# Patient Record
Sex: MALE | Race: WHITE | NOT HISPANIC OR LATINO | Employment: OTHER | URBAN - METROPOLITAN AREA
[De-identification: names, ages, dates, MRNs, and addresses within clinical notes are randomized per-mention and may not be internally consistent; named-entity substitution may affect disease eponyms.]

---

## 2018-04-24 ENCOUNTER — HISTORICAL (OUTPATIENT)
Dept: ADMINISTRATIVE | Facility: HOSPITAL | Age: 68
End: 2018-04-24

## 2018-04-24 LAB
ALBUMIN SERPL-MCNC: 4.5 GM/DL (ref 3.4–5)
ALBUMIN/GLOB SERPL: 1.8 {RATIO} (ref 1.5–2.5)
ALP SERPL-CCNC: 48 UNIT/L (ref 38–126)
ALT SERPL-CCNC: 12 UNIT/L (ref 7–52)
AST SERPL-CCNC: 13 UNIT/L (ref 15–37)
BILIRUB SERPL-MCNC: 0.7 MG/DL (ref 0.2–1)
BILIRUBIN DIRECT+TOT PNL SERPL-MCNC: 0.1 MG/DL (ref 0–0.5)
BUN SERPL-MCNC: 24 MG/DL (ref 7–18)
CALCIUM SERPL-MCNC: 9.3 MG/DL (ref 8.5–10)
CHLORIDE SERPL-SCNC: 105 MMOL/L (ref 98–107)
CHOLEST SERPL-MCNC: 139 MG/DL (ref 0–200)
CHOLEST/HDLC SERPL: 3.7 {RATIO}
CO2 SERPL-SCNC: 25 MMOL/L (ref 21–32)
CREAT SERPL-MCNC: 0.97 MG/DL (ref 0.6–1.3)
CREAT UR-MCNC: 100 MG/DL
CREAT/UREA NIT SERPL: 24.7
EST. AVERAGE GLUCOSE BLD GHB EST-MCNC: 209 MG/DL
GGT SERPL-CCNC: 14 UNIT/L (ref 5–85)
GLOBULIN SER-MCNC: 2.5 GM/DL (ref 1.2–3)
GLUCOSE SERPL-MCNC: 215 MG/DL (ref 74–106)
HBA1C MFR BLD: 8.9 % (ref 4.4–6.4)
HDLC SERPL-MCNC: 38 MG/DL (ref 35–60)
LDH SERPL-CCNC: 203 UNIT/L (ref 140–271)
LDLC SERPL CALC-MCNC: 70 MG/DL (ref 0–129)
MICROALBUMIN UR-MCNC: 10 MG/L
MICROALBUMIN/CREAT RATIO PNL UR: <30 MG/GM
POTASSIUM SERPL-SCNC: 4.7 MMOL/L (ref 3.5–5.1)
PROT SERPL-MCNC: 7 GM/DL (ref 6.4–8.2)
SODIUM SERPL-SCNC: 138 MMOL/L (ref 136–145)
TRIGL SERPL-MCNC: 162 MG/DL (ref 30–150)
URATE SERPL-MCNC: 4.3 MG/DL (ref 2.6–7.2)
VLDLC SERPL CALC-MCNC: 32.4 MG/DL

## 2018-11-21 ENCOUNTER — HISTORICAL (OUTPATIENT)
Dept: ADMINISTRATIVE | Facility: HOSPITAL | Age: 68
End: 2018-11-21

## 2018-11-21 LAB
ABS NEUT (OLG): 3.7 X10(3)/MCL (ref 2.1–9.2)
ALBUMIN SERPL-MCNC: 4.4 GM/DL (ref 3.4–5)
ALBUMIN/GLOB SERPL: 1.57 {RATIO} (ref 1.5–2.5)
ALP SERPL-CCNC: 47 UNIT/L (ref 38–126)
ALT SERPL-CCNC: 12 UNIT/L (ref 7–52)
APPEARANCE, UA: CLEAR
AST SERPL-CCNC: 12 UNIT/L (ref 15–37)
BACTERIA #/AREA URNS AUTO: ABNORMAL /HPF
BILIRUB SERPL-MCNC: 0.8 MG/DL (ref 0.2–1)
BILIRUB UR QL STRIP: NEGATIVE MG/DL
BILIRUBIN DIRECT+TOT PNL SERPL-MCNC: 0.2 MG/DL (ref 0–0.5)
BILIRUBIN DIRECT+TOT PNL SERPL-MCNC: 0.6 MG/DL
BUN SERPL-MCNC: 23 MG/DL (ref 7–18)
CALCIUM SERPL-MCNC: 8.9 MG/DL (ref 8.5–10)
CHLORIDE SERPL-SCNC: 100 MMOL/L (ref 98–107)
CHOLEST SERPL-MCNC: 149 MG/DL (ref 0–200)
CHOLEST/HDLC SERPL: 3.9 {RATIO}
CO2 SERPL-SCNC: 29 MMOL/L (ref 21–32)
COLOR UR: YELLOW
CREAT SERPL-MCNC: 0.97 MG/DL (ref 0.6–1.3)
CREAT UR-MCNC: 100 MG/DL
ERYTHROCYTE [DISTWIDTH] IN BLOOD BY AUTOMATED COUNT: 12.7 % (ref 11.5–17)
EST. AVERAGE GLUCOSE BLD GHB EST-MCNC: 243 MG/DL
GLOBULIN SER-MCNC: 2.8 GM/DL (ref 1.2–3)
GLUCOSE (UA): ABNORMAL MG/DL
GLUCOSE SERPL-MCNC: 262 MG/DL (ref 74–106)
HBA1C MFR BLD: 10.1 % (ref 4.4–6.4)
HCT VFR BLD AUTO: 49.8 % (ref 42–52)
HDLC SERPL-MCNC: 38 MG/DL (ref 35–60)
HGB BLD-MCNC: 16.1 GM/DL (ref 14–18)
HGB UR QL STRIP: NEGATIVE UNIT/L
KETONES UR QL STRIP: ABNORMAL MG/DL
LDLC SERPL CALC-MCNC: 76 MG/DL (ref 0–129)
LEUKOCYTE ESTERASE UR QL STRIP: NEGATIVE UNIT/L
LYMPHOCYTES # BLD AUTO: 1.8 X10(3)/MCL (ref 0.6–3.4)
LYMPHOCYTES NFR BLD AUTO: 30.1 % (ref 13–40)
MCH RBC QN AUTO: 31.4 PG (ref 27–31.2)
MCHC RBC AUTO-ENTMCNC: 32 GM/DL (ref 32–36)
MCV RBC AUTO: 97 FL (ref 80–94)
MICROALBUMIN UR-MCNC: 10 MG/L
MICROALBUMIN/CREAT RATIO PNL UR: <30 MG/GM
MONOCYTES # BLD AUTO: 0.6 X10(3)/MCL (ref 0.1–1.3)
MONOCYTES NFR BLD AUTO: 9.8 % (ref 0.1–24)
NEUTROPHILS NFR BLD AUTO: 60.1 % (ref 47–80)
NITRITE UR QL STRIP.AUTO: NEGATIVE
PH UR STRIP: 5 [PH]
PLATELET # BLD AUTO: 198 X10(3)/MCL (ref 130–400)
PMV BLD AUTO: 8.8 FL (ref 9.4–12.4)
POTASSIUM SERPL-SCNC: 4.4 MMOL/L (ref 3.5–5.1)
PROT SERPL-MCNC: 7.2 GM/DL (ref 6.4–8.2)
PROT UR QL STRIP: NEGATIVE MG/DL
PSA SERPL-MCNC: 0.47 NG/ML (ref 0–4.5)
RBC # BLD AUTO: 5.12 X10(6)/MCL (ref 4.7–6.1)
RBC #/AREA URNS HPF: ABNORMAL /HPF
SODIUM SERPL-SCNC: 137 MMOL/L (ref 136–145)
SP GR UR STRIP: 1.01
SQUAMOUS EPITHELIAL, UA: ABNORMAL /LPF
TRIGL SERPL-MCNC: 237 MG/DL (ref 30–150)
TSH SERPL-ACNC: 1.62 MIU/ML (ref 0.35–4.94)
UROBILINOGEN UR STRIP-ACNC: 0.2 MG/DL
VLDLC SERPL CALC-MCNC: 47.4 MG/DL
WBC # SPEC AUTO: 6.1 X10(3)/MCL (ref 4.5–11.5)
WBC #/AREA URNS AUTO: ABNORMAL /[HPF]

## 2018-12-05 ENCOUNTER — HISTORICAL (OUTPATIENT)
Dept: ADMINISTRATIVE | Facility: HOSPITAL | Age: 68
End: 2018-12-05

## 2019-03-28 ENCOUNTER — HISTORICAL (OUTPATIENT)
Dept: ADMINISTRATIVE | Facility: HOSPITAL | Age: 69
End: 2019-03-28

## 2019-03-28 ENCOUNTER — HISTORICAL (OUTPATIENT)
Dept: LAB | Facility: HOSPITAL | Age: 69
End: 2019-03-28

## 2019-03-28 LAB
ALBUMIN SERPL-MCNC: 4.5 GM/DL (ref 3.4–5)
ALBUMIN/GLOB SERPL: 2.25 {RATIO} (ref 1.5–2.5)
ALP SERPL-CCNC: 43 UNIT/L (ref 38–126)
ALT SERPL-CCNC: 12 UNIT/L (ref 7–52)
AST SERPL-CCNC: 18 UNIT/L (ref 15–37)
BILIRUB SERPL-MCNC: 0.7 MG/DL (ref 0.2–1)
BILIRUBIN DIRECT+TOT PNL SERPL-MCNC: 0.2 MG/DL (ref 0–0.5)
BILIRUBIN DIRECT+TOT PNL SERPL-MCNC: 0.5 MG/DL
BUN SERPL-MCNC: 20 MG/DL (ref 7–18)
CALCIUM SERPL-MCNC: 8.9 MG/DL (ref 8.5–10)
CHLORIDE SERPL-SCNC: 103 MMOL/L (ref 98–107)
CHOLEST SERPL-MCNC: 123 MG/DL (ref 0–200)
CHOLEST/HDLC SERPL: 3.4 {RATIO}
CO2 SERPL-SCNC: 31 MMOL/L (ref 21–32)
CREAT SERPL-MCNC: 0.97 MG/DL (ref 0.6–1.3)
CREAT UR-MCNC: 50 MG/DL
EST. AVERAGE GLUCOSE BLD GHB EST-MCNC: 163 MG/DL
GLOBULIN SER-MCNC: 2 GM/DL (ref 1.2–3)
GLUCOSE SERPL-MCNC: 138 MG/DL (ref 74–106)
HBA1C MFR BLD: 7.3 % (ref 4.4–6.4)
HDLC SERPL-MCNC: 36 MG/DL (ref 35–60)
LDLC SERPL CALC-MCNC: 63 MG/DL (ref 0–129)
MICROALBUMIN UR-MCNC: 10 MG/L
MICROALBUMIN/CREAT RATIO PNL UR: <30 MG/GM
POTASSIUM SERPL-SCNC: 4.8 MMOL/L (ref 3.5–5.1)
PROT SERPL-MCNC: 6.5 GM/DL (ref 6.4–8.2)
SODIUM SERPL-SCNC: 138 MMOL/L (ref 136–145)
TRIGL SERPL-MCNC: 138 MG/DL (ref 30–150)
URATE SERPL-MCNC: 4.2 MG/DL (ref 2.6–7.2)
VIT B12 SERPL-MCNC: 118 PG/ML (ref 193–986)
VLDLC SERPL CALC-MCNC: 27.6 MG/DL

## 2019-12-11 ENCOUNTER — HISTORICAL (OUTPATIENT)
Dept: ADMINISTRATIVE | Facility: HOSPITAL | Age: 69
End: 2019-12-11

## 2019-12-11 LAB
ABS NEUT (OLG): 3.9 X10(3)/MCL (ref 2.1–9.2)
ALBUMIN SERPL-MCNC: 4.2 GM/DL (ref 3.4–5)
ALBUMIN/GLOB SERPL: 1.91 {RATIO} (ref 1.5–2.5)
ALP SERPL-CCNC: 47 UNIT/L (ref 38–126)
ALT SERPL-CCNC: 11 UNIT/L (ref 7–52)
APPEARANCE, UA: CLEAR
AST SERPL-CCNC: 12 UNIT/L (ref 15–37)
BACTERIA #/AREA URNS AUTO: ABNORMAL /HPF
BILIRUB SERPL-MCNC: 0.5 MG/DL (ref 0.2–1)
BILIRUB UR QL STRIP: NEGATIVE MG/DL
BILIRUBIN DIRECT+TOT PNL SERPL-MCNC: 0.1 MG/DL (ref 0–0.5)
BILIRUBIN DIRECT+TOT PNL SERPL-MCNC: 0.4 MG/DL
BUN SERPL-MCNC: 29 MG/DL (ref 7–18)
CALCIUM SERPL-MCNC: 8.6 MG/DL (ref 8.5–10)
CHLORIDE SERPL-SCNC: 103 MMOL/L (ref 98–107)
CHOLEST SERPL-MCNC: 134 MG/DL (ref 0–200)
CHOLEST/HDLC SERPL: 3.8 {RATIO}
CO2 SERPL-SCNC: 30 MMOL/L (ref 21–32)
COLOR UR: YELLOW
CREAT SERPL-MCNC: 1.05 MG/DL (ref 0.6–1.3)
CREAT UR-MCNC: 100 MG/DL
ERYTHROCYTE [DISTWIDTH] IN BLOOD BY AUTOMATED COUNT: 12.7 % (ref 11.5–17)
EST. AVERAGE GLUCOSE BLD GHB EST-MCNC: 154 MG/DL
GLOBULIN SER-MCNC: 2.2 GM/DL (ref 1.2–3)
GLUCOSE (UA): ABNORMAL MG/DL
GLUCOSE SERPL-MCNC: 176 MG/DL (ref 74–106)
HBA1C MFR BLD: 7 % (ref 4.4–6.4)
HCT VFR BLD AUTO: 44.5 % (ref 42–52)
HDLC SERPL-MCNC: 35 MG/DL (ref 35–60)
HGB BLD-MCNC: 15.2 GM/DL (ref 14–18)
HGB UR QL STRIP: NEGATIVE UNIT/L
KETONES UR QL STRIP: NEGATIVE MG/DL
LDLC SERPL CALC-MCNC: 78 MG/DL (ref 0–129)
LEUKOCYTE ESTERASE UR QL STRIP: NEGATIVE UNIT/L
LYMPHOCYTES # BLD AUTO: 1.7 X10(3)/MCL (ref 0.6–3.4)
LYMPHOCYTES NFR BLD AUTO: 26.4 % (ref 13–40)
MCH RBC QN AUTO: 31.5 PG (ref 27–31.2)
MCHC RBC AUTO-ENTMCNC: 34 GM/DL (ref 32–36)
MCV RBC AUTO: 92 FL (ref 80–94)
MICROALBUMIN UR-MCNC: 10 MG/L
MICROALBUMIN/CREAT RATIO PNL UR: <30 MG/GM
MONOCYTES # BLD AUTO: 0.9 X10(3)/MCL (ref 0.1–1.3)
MONOCYTES NFR BLD AUTO: 13.1 % (ref 0.1–24)
NEUTROPHILS NFR BLD AUTO: 60.5 % (ref 47–80)
NITRITE UR QL STRIP.AUTO: NEGATIVE
PH UR STRIP: 5.5 [PH]
PLATELET # BLD AUTO: 219 X10(3)/MCL (ref 130–400)
PMV BLD AUTO: 9.3 FL (ref 9.4–12.4)
POTASSIUM SERPL-SCNC: 4.4 MMOL/L (ref 3.5–5.1)
PROT SERPL-MCNC: 6.4 GM/DL (ref 6.4–8.2)
PROT UR QL STRIP: NEGATIVE MG/DL
PSA SERPL-MCNC: 0.53 NG/ML (ref 0–4.5)
RBC # BLD AUTO: 4.82 X10(6)/MCL (ref 4.7–6.1)
RBC #/AREA URNS HPF: ABNORMAL /HPF
SODIUM SERPL-SCNC: 137 MMOL/L (ref 136–145)
SP GR UR STRIP: 1.02
SQUAMOUS EPITHELIAL, UA: ABNORMAL /LPF
TRIGL SERPL-MCNC: 142 MG/DL (ref 30–150)
TSH SERPL-ACNC: 1.27 MIU/ML (ref 0.35–4.94)
URATE SERPL-MCNC: 4.4 MG/DL (ref 2.6–7.2)
UROBILINOGEN UR STRIP-ACNC: 0.2 MG/DL
VLDLC SERPL CALC-MCNC: 28.4 MG/DL
WBC # SPEC AUTO: 6.5 X10(3)/MCL (ref 4.5–11.5)
WBC #/AREA URNS AUTO: ABNORMAL /[HPF]

## 2020-06-17 ENCOUNTER — HISTORICAL (OUTPATIENT)
Dept: ADMINISTRATIVE | Facility: HOSPITAL | Age: 70
End: 2020-06-17

## 2020-06-17 LAB
ABS NEUT (OLG): 5.1 X10(3)/MCL (ref 2.1–9.2)
ALBUMIN SERPL-MCNC: 4.5 GM/DL (ref 3.4–5)
ALBUMIN/GLOB SERPL: 2.05 {RATIO} (ref 1.5–2.5)
ALP SERPL-CCNC: 58 UNIT/L (ref 38–126)
ALT SERPL-CCNC: 13 UNIT/L (ref 7–52)
AST SERPL-CCNC: 16 UNIT/L (ref 15–37)
BILIRUB SERPL-MCNC: 0.5 MG/DL (ref 0.2–1)
BILIRUBIN DIRECT+TOT PNL SERPL-MCNC: 0.1 MG/DL (ref 0–0.5)
BILIRUBIN DIRECT+TOT PNL SERPL-MCNC: 0.4 MG/DL
BUN SERPL-MCNC: 30 MG/DL (ref 7–18)
CALCIUM SERPL-MCNC: 9.6 MG/DL (ref 8.5–10)
CHLORIDE SERPL-SCNC: 101 MMOL/L (ref 98–107)
CHOLEST SERPL-MCNC: 153 MG/DL (ref 0–200)
CHOLEST/HDLC SERPL: 4 {RATIO}
CO2 SERPL-SCNC: 29 MMOL/L (ref 21–32)
CREAT SERPL-MCNC: 1.12 MG/DL (ref 0.6–1.3)
CREAT UR-MCNC: 100 MG/DL
ERYTHROCYTE [DISTWIDTH] IN BLOOD BY AUTOMATED COUNT: 12.6 % (ref 11.5–17)
GLOBULIN SER-MCNC: 2.2 GM/DL (ref 1.2–3)
GLUCOSE SERPL-MCNC: 169 MG/DL (ref 74–106)
HCT VFR BLD AUTO: 47.6 % (ref 42–52)
HDLC SERPL-MCNC: 38 MG/DL (ref 35–60)
HGB BLD-MCNC: 15.8 GM/DL (ref 14–18)
LDLC SERPL CALC-MCNC: 73 MG/DL (ref 0–129)
LYMPHOCYTES # BLD AUTO: 1.7 X10(3)/MCL (ref 0.6–3.4)
LYMPHOCYTES NFR BLD AUTO: 22.7 % (ref 13–40)
MCH RBC QN AUTO: 31.2 PG (ref 27–31.2)
MCHC RBC AUTO-ENTMCNC: 33 GM/DL (ref 32–36)
MCV RBC AUTO: 94 FL (ref 80–94)
MICROALBUMIN UR-MCNC: 10 MG/L
MICROALBUMIN/CREAT RATIO PNL UR: <30 MG/GM
MONOCYTES # BLD AUTO: 0.7 X10(3)/MCL (ref 0.1–1.3)
MONOCYTES NFR BLD AUTO: 9.1 % (ref 0.1–24)
NEUTROPHILS NFR BLD AUTO: 68.2 % (ref 47–80)
PLATELET # BLD AUTO: 236 X10(3)/MCL (ref 130–400)
PMV BLD AUTO: 9.3 FL (ref 9.4–12.4)
POTASSIUM SERPL-SCNC: 5.2 MMOL/L (ref 3.5–5.1)
PROT SERPL-MCNC: 6.7 GM/DL (ref 6.4–8.2)
RBC # BLD AUTO: 5.06 X10(6)/MCL (ref 4.7–6.1)
SODIUM SERPL-SCNC: 139 MMOL/L (ref 136–145)
TRIGL SERPL-MCNC: 247 MG/DL (ref 30–150)
URATE SERPL-MCNC: 3.7 MG/DL (ref 2.6–7.2)
VIT B12 SERPL-MCNC: 257 PG/ML (ref 213–816)
VLDLC SERPL CALC-MCNC: 49.4 MG/DL
WBC # SPEC AUTO: 7.5 X10(3)/MCL (ref 4.5–11.5)

## 2020-12-21 ENCOUNTER — HISTORICAL (OUTPATIENT)
Dept: ADMINISTRATIVE | Facility: HOSPITAL | Age: 70
End: 2020-12-21

## 2020-12-21 LAB
ABS NEUT (OLG): 5 X10(3)/MCL (ref 2.1–9.2)
ALBUMIN SERPL-MCNC: 4.5 GM/DL (ref 3.4–5)
ALBUMIN/GLOB SERPL: 2.14 {RATIO} (ref 1.5–2.5)
ALP SERPL-CCNC: 56 UNIT/L (ref 38–126)
ALT SERPL-CCNC: 12 UNIT/L (ref 7–52)
APPEARANCE, UA: CLEAR
AST SERPL-CCNC: 16 UNIT/L (ref 15–37)
BACTERIA #/AREA URNS AUTO: ABNORMAL /HPF
BILIRUB SERPL-MCNC: 0.5 MG/DL (ref 0.2–1)
BILIRUB UR QL STRIP: NEGATIVE MG/DL
BILIRUBIN DIRECT+TOT PNL SERPL-MCNC: 0.1 MG/DL (ref 0–0.5)
BILIRUBIN DIRECT+TOT PNL SERPL-MCNC: 0.4 MG/DL
BUN SERPL-MCNC: 24 MG/DL (ref 7–18)
CALCIUM SERPL-MCNC: 9.3 MG/DL (ref 8.5–10.1)
CHLORIDE SERPL-SCNC: 103 MMOL/L (ref 98–107)
CHOLEST SERPL-MCNC: 122 MG/DL (ref 0–200)
CHOLEST/HDLC SERPL: 2.9 {RATIO}
CO2 SERPL-SCNC: 30 MMOL/L (ref 21–32)
COLOR UR: YELLOW
CREAT SERPL-MCNC: 0.93 MG/DL (ref 0.6–1.3)
ERYTHROCYTE [DISTWIDTH] IN BLOOD BY AUTOMATED COUNT: 13 % (ref 11.5–17)
GLOBULIN SER-MCNC: 2.1 GM/DL (ref 1.2–3)
GLUCOSE (UA): ABNORMAL MG/DL
GLUCOSE SERPL-MCNC: 115 MG/DL (ref 74–106)
HCT VFR BLD AUTO: 48.1 % (ref 42–52)
HDLC SERPL-MCNC: 42 MG/DL (ref 35–60)
HGB BLD-MCNC: 16.2 GM/DL (ref 14–18)
HGB UR QL STRIP: NEGATIVE UNIT/L
KETONES UR QL STRIP: ABNORMAL MG/DL
LDLC SERPL CALC-MCNC: 69 MG/DL (ref 0–129)
LEUKOCYTE ESTERASE UR QL STRIP: NEGATIVE UNIT/L
LYMPHOCYTES # BLD AUTO: 1.8 X10(3)/MCL (ref 0.6–3.4)
LYMPHOCYTES NFR BLD AUTO: 24 % (ref 13–40)
MCH RBC QN AUTO: 31.8 PG (ref 27–31.2)
MCHC RBC AUTO-ENTMCNC: 34 GM/DL (ref 32–36)
MCV RBC AUTO: 94 FL (ref 80–94)
MONOCYTES # BLD AUTO: 0.7 X10(3)/MCL (ref 0.1–1.3)
MONOCYTES NFR BLD AUTO: 8.9 % (ref 0.1–24)
NEUTROPHILS NFR BLD AUTO: 67.1 % (ref 47–80)
NITRITE UR QL STRIP.AUTO: NEGATIVE
PH UR STRIP: 5 [PH]
PLATELET # BLD AUTO: 222 X10(3)/MCL (ref 130–400)
PMV BLD AUTO: 8.6 FL (ref 9.4–12.4)
POTASSIUM SERPL-SCNC: 4.5 MMOL/L (ref 3.5–5.1)
PROT SERPL-MCNC: 6.6 GM/DL (ref 6.4–8.2)
PROT UR QL STRIP: NEGATIVE MG/DL
PSA SERPL-MCNC: 0.49 NG/ML (ref 0–4.5)
RBC # BLD AUTO: 5.09 X10(6)/MCL (ref 4.7–6.1)
RBC #/AREA URNS HPF: ABNORMAL /HPF
SODIUM SERPL-SCNC: 140 MMOL/L (ref 136–145)
SP GR UR STRIP: 1.01
SQUAMOUS EPITHELIAL, UA: ABNORMAL /LPF
TRIGL SERPL-MCNC: 151 MG/DL (ref 30–150)
TSH SERPL-ACNC: 1.77 MIU/ML (ref 0.35–4.94)
URATE SERPL-MCNC: 3.7 MG/DL (ref 2.6–7.2)
UROBILINOGEN UR STRIP-ACNC: 0.2 MG/DL
VLDLC SERPL CALC-MCNC: 30.2 MG/DL
WBC # SPEC AUTO: 7.5 X10(3)/MCL (ref 4.5–11.5)
WBC #/AREA URNS AUTO: ABNORMAL /[HPF]

## 2021-03-09 ENCOUNTER — HISTORICAL (OUTPATIENT)
Dept: MEDSURG UNIT | Facility: HOSPITAL | Age: 71
End: 2021-03-09

## 2021-03-09 LAB
CHOLEST SERPL-MCNC: 82 MG/DL
CHOLEST/HDLC SERPL: 3 {RATIO} (ref 0–5)
HDLC SERPL-MCNC: 30 MG/DL (ref 35–60)
LDLC SERPL CALC-MCNC: 45 MG/DL (ref 50–140)
TRIGL SERPL-MCNC: 35 MG/DL (ref 34–140)
VLDLC SERPL CALC-MCNC: 7 MG/DL

## 2021-03-10 LAB
ABS NEUT (OLG): 4.46 X10(3)/MCL (ref 2.1–9.2)
ALBUMIN SERPL-MCNC: 3.9 GM/DL (ref 3.4–4.8)
ALBUMIN/GLOB SERPL: 1.3 RATIO (ref 1.1–2)
ALP SERPL-CCNC: 58 UNIT/L (ref 40–150)
ALT SERPL-CCNC: 14 UNIT/L (ref 0–55)
AST SERPL-CCNC: 21 UNIT/L (ref 5–34)
BASOPHILS # BLD AUTO: 0 X10(3)/MCL (ref 0–0.2)
BASOPHILS NFR BLD AUTO: 0 %
BILIRUB SERPL-MCNC: 0.5 MG/DL
BILIRUBIN DIRECT+TOT PNL SERPL-MCNC: 0.2 MG/DL (ref 0–0.5)
BILIRUBIN DIRECT+TOT PNL SERPL-MCNC: 0.3 MG/DL (ref 0–0.8)
BUN SERPL-MCNC: 20.5 MG/DL (ref 8.4–25.7)
CALCIUM SERPL-MCNC: 8.5 MG/DL (ref 8.8–10)
CHLORIDE SERPL-SCNC: 103 MMOL/L (ref 98–107)
CO2 SERPL-SCNC: 29 MMOL/L (ref 23–31)
CREAT SERPL-MCNC: 0.84 MG/DL (ref 0.73–1.18)
EOSINOPHIL # BLD AUTO: 0.3 X10(3)/MCL (ref 0–0.9)
EOSINOPHIL NFR BLD AUTO: 4 %
ERYTHROCYTE [DISTWIDTH] IN BLOOD BY AUTOMATED COUNT: 13.1 % (ref 11.5–17)
GLOBULIN SER-MCNC: 2.9 GM/DL (ref 2.4–3.5)
GLUCOSE SERPL-MCNC: 93 MG/DL (ref 82–115)
HCT VFR BLD AUTO: 47.6 % (ref 42–52)
HGB BLD-MCNC: 15.5 GM/DL (ref 14–18)
LYMPHOCYTES # BLD AUTO: 1.4 X10(3)/MCL (ref 0.6–4.6)
LYMPHOCYTES NFR BLD AUTO: 21 %
MCH RBC QN AUTO: 31.8 PG (ref 27–31)
MCHC RBC AUTO-ENTMCNC: 32.6 GM/DL (ref 33–36)
MCV RBC AUTO: 97.7 FL (ref 80–94)
MONOCYTES # BLD AUTO: 0.5 X10(3)/MCL (ref 0.1–1.3)
MONOCYTES NFR BLD AUTO: 8 %
NEUTROPHILS # BLD AUTO: 4.46 X10(3)/MCL (ref 2.1–9.2)
NEUTROPHILS NFR BLD AUTO: 66 %
PLATELET # BLD AUTO: 213 X10(3)/MCL (ref 130–400)
PMV BLD AUTO: 9.8 FL (ref 9.4–12.4)
POTASSIUM SERPL-SCNC: 4.2 MMOL/L (ref 3.5–5.1)
PROT SERPL-MCNC: 6.8 GM/DL (ref 5.8–7.6)
RBC # BLD AUTO: 4.87 X10(6)/MCL (ref 4.7–6.1)
SODIUM SERPL-SCNC: 139 MMOL/L (ref 136–145)
WBC # SPEC AUTO: 6.8 X10(3)/MCL (ref 4.5–11.5)

## 2021-03-31 LAB
ABS NEUT (OLG): 4.45 X10(3)/MCL (ref 2.1–9.2)
ALBUMIN SERPL-MCNC: 3.6 GM/DL (ref 3.4–4.8)
ALBUMIN/GLOB SERPL: 1.3 RATIO (ref 1.1–2)
ALP SERPL-CCNC: 68 UNIT/L (ref 40–150)
ALT SERPL-CCNC: 16 UNIT/L (ref 0–55)
AST SERPL-CCNC: 13 UNIT/L (ref 5–34)
BASOPHILS # BLD AUTO: 0.03 X10(3)/MCL (ref 0–0.2)
BASOPHILS NFR BLD AUTO: 0.4 % (ref 0–0.9)
BILIRUB SERPL-MCNC: 0.8 MG/DL (ref 0.2–1.2)
BILIRUBIN DIRECT+TOT PNL SERPL-MCNC: 0.4 MG/DL (ref 0–0.5)
BILIRUBIN DIRECT+TOT PNL SERPL-MCNC: 0.4 MG/DL (ref 0–0.8)
BUN SERPL-MCNC: 21.1 MG/DL (ref 8.4–25.7)
CALCIUM SERPL-MCNC: 9.2 MG/DL (ref 8.8–10)
CHLORIDE SERPL-SCNC: 105 MMOL/L (ref 98–107)
CHOLEST SERPL-MCNC: 96 MG/DL
CHOLEST/HDLC SERPL: 3 {RATIO} (ref 0–5)
CO2 SERPL-SCNC: 28 MMOL/L (ref 23–31)
CREAT SERPL-MCNC: 0.96 MG/DL (ref 0.72–1.25)
EOSINOPHIL # BLD AUTO: 0.22 X10(3)/MCL (ref 0–0.9)
EOSINOPHIL NFR BLD AUTO: 3.2 % (ref 0–6.5)
ERYTHROCYTE [DISTWIDTH] IN BLOOD BY AUTOMATED COUNT: 12.7 % (ref 11.5–17)
FERRITIN SERPL-MCNC: 291.47 NG/ML (ref 21.81–274.66)
GLOBULIN SER-MCNC: 2.8 GM/DL (ref 2.4–3.5)
GLUCOSE SERPL-MCNC: 153 MG/DL (ref 82–115)
HCT VFR BLD AUTO: 44.6 % (ref 42–52)
HDLC SERPL-MCNC: 33 MG/DL (ref 40–60)
HGB BLD-MCNC: 14.8 GM/DL (ref 14–18)
IMM GRANULOCYTES # BLD AUTO: 0.06 10*3/UL (ref 0–0.02)
IMM GRANULOCYTES NFR BLD AUTO: 0.9 % (ref 0–0.43)
LDLC SERPL CALC-MCNC: 43 MG/DL (ref 50–140)
LYMPHOCYTES # BLD AUTO: 1.45 X10(3)/MCL (ref 0.6–4.6)
LYMPHOCYTES NFR BLD AUTO: 21.4 % (ref 16.2–38.3)
MAGNESIUM SERPL-MCNC: 2.12 MG/DL (ref 1.6–2.6)
MCH RBC QN AUTO: 31.6 PG (ref 27–31)
MCHC RBC AUTO-ENTMCNC: 33.2 GM/DL (ref 33–36)
MCV RBC AUTO: 95.3 FL (ref 80–94)
MONOCYTES # BLD AUTO: 0.57 X10(3)/MCL (ref 0.1–1.3)
MONOCYTES NFR BLD AUTO: 8.4 % (ref 4.7–11.3)
NEUTROPHILS # BLD AUTO: 4.45 X10(3)/MCL (ref 2.1–9.2)
NEUTROPHILS NFR BLD AUTO: 65.7 % (ref 49.1–73.4)
NRBC BLD AUTO-RTO: 0 % (ref 0–0.2)
PHOSPHATE SERPL-MCNC: 4.7 MG/DL (ref 2.3–4.7)
PLATELET # BLD AUTO: 204 X10(3)/MCL (ref 130–400)
PMV BLD AUTO: 9.8 FL (ref 7.4–10.4)
POTASSIUM SERPL-SCNC: 3.9 MMOL/L (ref 3.5–5.1)
PREALB SERPL-MCNC: 26.5 MG/DL (ref 16–42)
PROT SERPL-MCNC: 6.4 GM/DL (ref 5.8–7.6)
RBC # BLD AUTO: 4.68 X10(6)/MCL (ref 4.7–6.1)
SODIUM SERPL-SCNC: 143 MMOL/L (ref 136–145)
TRIGL SERPL-MCNC: 102 MG/DL (ref 0–150)
VLDLC SERPL CALC-MCNC: 20 MG/DL
WBC # SPEC AUTO: 6.8 X10(3)/MCL (ref 4.5–11.5)

## 2021-04-05 LAB
ABS NEUT (OLG): 4.26 X10(3)/MCL (ref 2.1–9.2)
ALBUMIN SERPL-MCNC: 3.7 GM/DL (ref 3.4–4.8)
ALBUMIN/GLOB SERPL: 1.2 RATIO (ref 1.1–2)
ALP SERPL-CCNC: 76 UNIT/L (ref 40–150)
ALT SERPL-CCNC: 18 UNIT/L (ref 0–55)
AST SERPL-CCNC: 13 UNIT/L (ref 5–34)
BASOPHILS # BLD AUTO: 0.03 X10(3)/MCL (ref 0–0.2)
BASOPHILS NFR BLD AUTO: 0.5 % (ref 0–0.9)
BILIRUB SERPL-MCNC: 0.7 MG/DL (ref 0.2–1.2)
BILIRUBIN DIRECT+TOT PNL SERPL-MCNC: 0.3 MG/DL (ref 0–0.8)
BILIRUBIN DIRECT+TOT PNL SERPL-MCNC: 0.4 MG/DL (ref 0–0.5)
BUN SERPL-MCNC: 19.1 MG/DL (ref 8.4–25.7)
CALCIUM SERPL-MCNC: 9.4 MG/DL (ref 8.8–10)
CHLORIDE SERPL-SCNC: 104 MMOL/L (ref 98–107)
CO2 SERPL-SCNC: 28 MMOL/L (ref 23–31)
CREAT SERPL-MCNC: 0.95 MG/DL (ref 0.72–1.25)
EOSINOPHIL # BLD AUTO: 0.12 X10(3)/MCL (ref 0–0.9)
EOSINOPHIL NFR BLD AUTO: 1.8 % (ref 0–6.5)
ERYTHROCYTE [DISTWIDTH] IN BLOOD BY AUTOMATED COUNT: 12.4 % (ref 11.5–17)
GLOBULIN SER-MCNC: 3.1 GM/DL (ref 2.4–3.5)
GLUCOSE SERPL-MCNC: 134 MG/DL (ref 82–115)
HCT VFR BLD AUTO: 46.4 % (ref 42–52)
HGB BLD-MCNC: 15.1 GM/DL (ref 14–18)
IMM GRANULOCYTES # BLD AUTO: 0.08 10*3/UL (ref 0–0.02)
IMM GRANULOCYTES NFR BLD AUTO: 1.2 % (ref 0–0.43)
LYMPHOCYTES # BLD AUTO: 1.47 X10(3)/MCL (ref 0.6–4.6)
LYMPHOCYTES NFR BLD AUTO: 22.5 % (ref 16.2–38.3)
MCH RBC QN AUTO: 31.1 PG (ref 27–31)
MCHC RBC AUTO-ENTMCNC: 32.5 GM/DL (ref 33–36)
MCV RBC AUTO: 95.5 FL (ref 80–94)
MONOCYTES # BLD AUTO: 0.58 X10(3)/MCL (ref 0.1–1.3)
MONOCYTES NFR BLD AUTO: 8.9 % (ref 4.7–11.3)
NEUTROPHILS # BLD AUTO: 4.26 X10(3)/MCL (ref 2.1–9.2)
NEUTROPHILS NFR BLD AUTO: 65.1 % (ref 49.1–73.4)
NRBC BLD AUTO-RTO: 0 % (ref 0–0.2)
PLATELET # BLD AUTO: 183 X10(3)/MCL (ref 130–400)
PMV BLD AUTO: 9.8 FL (ref 7.4–10.4)
POTASSIUM SERPL-SCNC: 4.1 MMOL/L (ref 3.5–5.1)
PREALB SERPL-MCNC: 28.3 MG/DL (ref 16–42)
PROT SERPL-MCNC: 6.8 GM/DL (ref 5.8–7.6)
RBC # BLD AUTO: 4.86 X10(6)/MCL (ref 4.7–6.1)
SODIUM SERPL-SCNC: 143 MMOL/L (ref 136–145)
WBC # SPEC AUTO: 6.5 X10(3)/MCL (ref 4.5–11.5)

## 2021-04-06 ENCOUNTER — HISTORICAL (OUTPATIENT)
Dept: ADMINISTRATIVE | Facility: HOSPITAL | Age: 71
End: 2021-04-06

## 2021-04-27 ENCOUNTER — HISTORICAL (OUTPATIENT)
Dept: RADIOLOGY | Facility: HOSPITAL | Age: 71
End: 2021-04-27

## 2021-12-14 ENCOUNTER — HISTORICAL (OUTPATIENT)
Dept: ADMINISTRATIVE | Facility: HOSPITAL | Age: 71
End: 2021-12-14

## 2021-12-14 LAB
ABS NEUT (OLG): 3.4 X10(3)/MCL (ref 2.1–9.2)
ALBUMIN SERPL-MCNC: 4.7 GM/DL (ref 3.4–5)
ALBUMIN/GLOB SERPL: 2.47 {RATIO} (ref 1.5–2.5)
ALP SERPL-CCNC: 53 UNIT/L (ref 38–126)
ALT SERPL-CCNC: 2 UNIT/L (ref 7–52)
APPEARANCE, UA: CLEAR
AST SERPL-CCNC: 15 UNIT/L (ref 15–37)
BACTERIA #/AREA URNS AUTO: ABNORMAL /HPF
BILIRUB SERPL-MCNC: 0.7 MG/DL (ref 0.2–1)
BILIRUB UR QL STRIP: NEGATIVE MG/DL
BILIRUBIN DIRECT+TOT PNL SERPL-MCNC: 0.2 MG/DL (ref 0–0.5)
BILIRUBIN DIRECT+TOT PNL SERPL-MCNC: 0.5 MG/DL
BUN SERPL-MCNC: 22 MG/DL (ref 7–18)
CALCIUM SERPL-MCNC: 9.6 MG/DL (ref 8.5–10.1)
CHLORIDE SERPL-SCNC: 98 MMOL/L (ref 98–107)
CHOLEST SERPL-MCNC: 138 MG/DL (ref 0–200)
CHOLEST/HDLC SERPL: 3 {RATIO}
CO2 SERPL-SCNC: 32 MMOL/L (ref 21–32)
COLOR UR: YELLOW
CREAT SERPL-MCNC: 0.94 MG/DL (ref 0.6–1.3)
ERYTHROCYTE [DISTWIDTH] IN BLOOD BY AUTOMATED COUNT: 13 % (ref 11.5–17)
GLOBULIN SER-MCNC: 1.9 GM/DL (ref 1.2–3)
GLUCOSE (UA): ABNORMAL MG/DL
GLUCOSE SERPL-MCNC: 119 MG/DL (ref 74–106)
HCT VFR BLD AUTO: 45.6 % (ref 42–52)
HDLC SERPL-MCNC: 46 MG/DL (ref 35–60)
HGB BLD-MCNC: 14.9 GM/DL (ref 14–18)
HGB UR QL STRIP: NEGATIVE UNIT/L
KETONES UR QL STRIP: ABNORMAL MG/DL
LDLC SERPL CALC-MCNC: 64 MG/DL (ref 0–129)
LEUKOCYTE ESTERASE UR QL STRIP: NEGATIVE UNIT/L
LYMPHOCYTES # BLD AUTO: 1.5 X10(3)/MCL (ref 0.6–3.4)
LYMPHOCYTES NFR BLD AUTO: 28.6 % (ref 13–40)
MCH RBC QN AUTO: 31 PG (ref 27–31.2)
MCHC RBC AUTO-ENTMCNC: 33 GM/DL (ref 32–36)
MCV RBC AUTO: 95 FL (ref 80–94)
MONOCYTES # BLD AUTO: 0.5 X10(3)/MCL (ref 0.1–1.3)
MONOCYTES NFR BLD AUTO: 9 % (ref 0.1–24)
NEUTROPHILS NFR BLD AUTO: 62.4 % (ref 47–80)
NITRITE UR QL STRIP.AUTO: NEGATIVE
PH UR STRIP: 5.5 [PH]
PLATELET # BLD AUTO: 245 X10(3)/MCL (ref 130–400)
PMV BLD AUTO: 8.6 FL (ref 9.4–12.4)
POTASSIUM SERPL-SCNC: 4.5 MMOL/L (ref 3.5–5.1)
PROT SERPL-MCNC: 6.6 GM/DL (ref 6.4–8.2)
PROT UR QL STRIP: NEGATIVE MG/DL
PSA SERPL-MCNC: 0.46 NG/ML (ref 0–6.5)
RBC # BLD AUTO: 4.81 X10(6)/MCL (ref 4.7–6.1)
RBC #/AREA URNS HPF: ABNORMAL /HPF
SODIUM SERPL-SCNC: 139 MMOL/L (ref 136–145)
SP GR UR STRIP: 1.02
SQUAMOUS EPITHELIAL, UA: ABNORMAL /LPF
TRIGL SERPL-MCNC: 176 MG/DL (ref 30–150)
URATE SERPL-MCNC: 3.2 MG/DL (ref 2.6–7.2)
UROBILINOGEN UR STRIP-ACNC: 0.2 MG/DL
VLDLC SERPL CALC-MCNC: 35.2 MG/DL
WBC # SPEC AUTO: 5.4 X10(3)/MCL (ref 4.5–11.5)
WBC #/AREA URNS AUTO: ABNORMAL /[HPF]

## 2022-04-10 ENCOUNTER — HISTORICAL (OUTPATIENT)
Dept: ADMINISTRATIVE | Facility: HOSPITAL | Age: 72
End: 2022-04-10
Payer: MEDICARE

## 2022-04-30 VITALS
BODY MASS INDEX: 20.72 KG/M2 | DIASTOLIC BLOOD PRESSURE: 70 MMHG | SYSTOLIC BLOOD PRESSURE: 100 MMHG | WEIGHT: 136.69 LBS | HEIGHT: 68 IN

## 2022-05-03 NOTE — HISTORICAL [facility] CERNER
This is a historical note converted from [employer]. Formatting and pictures may have been removed.  Please reference [employer] for original formatting and attached multimedia. Chief Complaint  ischemic CVA with hemorrhagic transformation  Reason for Consultation  Physiatry  History of Present Illness  Admit MD: Arun Vogel MD  Consult Physiatry: Duarte Luz MD  HPI: 71yo WM with PMH of CAD?s/p PCI, depression, gout, HLD, DMII, and HTN presented to [facility] ED?on 3/23 with complaints of left-sided weakness, facial droop and slurred speech. Initial CT of the head significant for a?right basal ganglia intraparenchymal hematoma and right parietal intraparenchymal hemorrhage. He was admitted to ICU. Neurology and neurosurgery consulted. MRI brain consistent with ischemic CVA with hemorrhagic transformation. MR angiogram of the head and neck normal. Neurosurgery with no plans for surgical intervention. Recommended close monitoring and keeping blood pressure parameters below 140/90. Therapy services consulted. Patient cleared for a dysphagia diet. Cardiology also was consulted as he had a recent PCI in March, 2021 and was on dual antiplatelets. Dual antiplatelets had to be held because of hemorrhagic CVA. Cleared to be transferred out of ICU on 3/26. Neurology signed off. Neurosurgery and Cardiology continued to follow.? Noted with persistent left-sided weakness.?Also noted to have tremors, bradykinesia concerning for possible Parkinsons disease. Neurology consulted. Daughter reports patient has been having tremors for quite some time before the stroke. Neurology evaluated and agreed that he could possibly have Parkinsons disease. Plan to initiate treatment and workup as outpatient. ?Participating with therapy.??He is participating with therapy.??Functional status includes bed mobility, sit to stand, grooming, and Amb w/HHA for 120ft requiring minimal assistance. Supervision or setup needed for supine to sit. Patient was  evaluated, accepted, and admitted to inpatient rehab to improve functional status. Transferred to [facility] on 3/30 without incident.???  4/1: Seen in OT, seated in WC. Reports fair sleep with continued lethargy throughout the day. States that he wakes up during the night, but unsure of reasoning. Tells me last night it was because of his TV being on and he couldnt find remote to turn it off. Denies pain, numbness, and/or tingling. Adamant to get better and take care of himself. Tells me he is very embarrassed about not being able to do for himself, and having to ask for help.?States having a bowel movement day before yesterday, and it being rather hard. Tells me it was the first BM he had since being in hospital. Discussed toilet retraining to prevent accidents. Admits to having a couple accidents with urine, and being rather embarrassed. Reports fair appetite. Motivated and determined to excel in therapy. Appreciative of therapist discussing Nyasia with him. Denies current complaints. VSSAF.  Review of Systems  Comprehensive Review of Systems performed with no exceptions other than as noted in HPI.  Barriers to Discharge:  Social:Patient completed law school / ? Patient is a retired . ? / Denies history of  involvement. Wife is retired and drives. ?She can assist patient after discharge but has physical limitations. ?I explained to her our process regarding family training. ?She is interested in family training and would like as much notice as possible.  Medical:Acute Right Parietal Intraparenchymal Hemorrhage with Right Basal Ganglia Hemorrhage,?Gout,?Depression,?Diabetes type 2,?HTN,?HLD,?CAD?  Functional:  Prior Level of Fx:?independent ADLs, drives, cooks, does chores, does laundry, grocery shops, manages finances, manages own meds, hires someone for lawn maintenance; has/does not have medic alert; Playing tennis 2x a week. ?Walking 2 miles. ?  Residence: ?Patient lives with wife in [place]. ?They  live in a multi-story home with a walk-up entrance. ?They have approximately 15 steps on the inside of the home- patient does not have to access the second story. ?Patient has a walk-in shower. ?*Does not have elevated toilet?  DME: ? Patient does not have any DME  Psychiatric:?Hx mental health/substance abuse: ?History of depression- currently on medication / Denies history of substance abuse  ?  Physical Exam  Vitals & Measurements  T:?36.3? ?C (Oral)? TMIN:?36.3? ?C (Oral)? TMAX:?36.5? ?C (Oral)? HR:?80(Peripheral)? RR:?17? BP:?107/70? SpO2:?95%?  General:?well-developed, well-nourished, in?mild distress-embarrassed to need help  Eye: EOMI, clear conjunctiva, eyelids normal  HENT:?normocephalic, ?oropharynx and nasal mucosal surfaces moist  Neck: full range of motion, supple  Respiratory:?clear to auscultation bilaterally  Cardiovascular:?regular rate and rhythm without murmurs, no edema  Gastrointestinal:?soft, non-tender, non-distended with normal bowel sounds, without masses to palpation  Musculoskeletal:?full range of motion of all extremities/spine with left sided weakness, LUE-uncoordinated  Integumentary: no rashes or skin lesions present, sacral pressure wound  Neurologic: cranial nerves intact,?left sided weakness, LUE-uncoordinated  ?  Assessment/Plan  1.?Cerebrovascular accident (CVA) of right basal ganglia?I63.81  2.?Intraparenchymal hemorrhage of brain?I61.9  3.?HTN (hypertension)?I10  4.?Hypercholesteremia?E78.00  5.?DM (diabetes mellitus), type 2?E11.9  6.?Gout?M10.9  7.?Depression?F32.9  8.?CAD (coronary artery disease)?I25.10  Orders:  Bladder Retraining, q2hr, 04/01/21 9:42:00 CDT, Offer Bedside Commode  Pain:?denies ?  Wounds:?none  Precautions:?fall risk??  Bracing: none  Bowels/Bladder: last BM 3/30? ?  Bladder Retraining, q2hr, 04/01/21 9:42:00 CDT, Offer Bedside Commode  Swallowing:?Diabetic Diet with mildly thickened liquids??  Sleep:?fair ?  Depression/Anxiety:  sertraline 50 mg oral  tablet)50 mg, form: Tab, Oral, Daily  alPRAzolam (Xanax 0.25 mg oral tablet)0.25 mg, form: Tab, Oral, TID PRN for anxiety  Function: Tolerating therapy. Continue PT/OT/RT  VTE Prophylaxis:?SCDs  aspirin 81 mg oral Delayed Release (EC) tablet)81 mg, form: Tab-EC, Oral, Daily  Code Status:? FULL CODE?  Discharge:??Patient lives with wife in [place]. ?They live in a multi-story home with a walk-up entrance. ?They have approximately 15 steps on the inside of the home- patient does not have to access the second story.  Patient is a retired . ? / Denies history of  involvement. Wife is retired and drives. ?She can assist patient after discharge but has physical limitations. ?I explained to her our process regarding family training. ?She is interested in family training and would like as much notice as possible.?Date pending. ?   Problem List/Past Medical History  Ongoing  CAD (coronary artery disease)  Depression  DM (diabetes mellitus), type 2  Gout  HTN (hypertension)  Hypercholesteremia  Nocturia  Wellness examination  Historical  No qualifying data  Procedure/Surgical History  Catheter placement in coronary artery(s) for coronary angiography, including intraprocedural injection(s) for coronary angiography, imaging supervision and interpretation; with left heart catheterization including intraprocedural injection(s) for left nish (03/09/2021)  Dilation of Coronary Artery, One Artery with Drug-eluting Intraluminal Device, Percutaneous Approach (03/09/2021)  Fluoroscopy of Left Heart using Low Osmolar Contrast (03/09/2021)  Fluoroscopy of Multiple Coronary Arteries using Low Osmolar Contrast (03/09/2021)  Measurement of Cardiac Sampling and Pressure, Left Heart, Percutaneous Approach (03/09/2021)  Percutaneous transcatheter placement of drug eluting intracoronary stent(s), with coronary angioplasty when performed; single major coronary artery or branch (03/09/2021)  Extraction of tooth  Tooth  implantation   Medications  Inpatient  acetaminophen 325 mg oral tablet, 325 mg= 1 tab(s), Oral, q4hr, PRN  acetaminophen 325 mg oral tablet, 650 mg= 2 tab(s), Oral, q6hr, PRN  allopurinol 300 mg oral tablet, 300 mg= 1 tab(s), Oral, Daily  aspirin 81 mg oral Delayed Release (EC) tablet, 81 mg= 1 tab(s), Oral, Daily  atorvastatin 10 mg oral tablet, 10 mg= 1 tab(s), Oral, Daily  Colace 100 mg oral capsule, 100 mg= 1 cap(s), Oral, BID  Dextrose 50% and Water (50 mL vial/syringe), 12.5 gm= 25 mL, IV Push, Once, PRN  Dextrose 50% and Water (50 mL vial/syringe), 12.5 gm= 25 mL, IV Push, As Directed, PRN  Dextrose 50% and Water (50 mL vial/syringe), 25 gm= 50 mL, IV Push, As Directed, PRN  Dextrose 50% in Water intravenous solution, 12.5 gm= 25 mL, IV Push, As Directed, PRN  Dulcolax Laxative 10 mg RECTAL suppository, 10 mg= 1 supp, IL (rectal), q3day, PRN  enalapril 5 mg oral tablet, 5 mg= 1 tab(s), Oral, Daily  glucagon, 1 mg= 1 EA, IM, q10min, PRN  glucagon, 1 mg= 1 EA, IM, q10min, PRN  hydrALAZINE, 10 mg= 0.5 mL, IV Push, q4hr, PRN  insulin lispro, 2-14 units, Subcutaneous, As Directed, PRN  Jardiance 25 mg oral tablet, 1 dose, Oral, Daily  labetalol, 10 mg= 2 mL, IV Push, q10min, PRN  lactulose 10 g/15 mL oral syrup, 20 gm= 30 mL, Oral, Every other day, PRN  LB[facility] melatonin 3 mg oral tablet, 3 mg= 1 tab(s), Oral, At Bedtime  Lopressor 1 mg/mL injectable solution, 10 mg= 10 mL, IV Push, q2hr, PRN  metformin 500 mg oral tablet, 500 mg= 1 tab(s), Oral, BID  MiraLax (polyethylene glycol 3350), 17 gm= 1 packet(s), Oral, BID  nitroglycerin 0.4 mg sublingual TAB, 0.4 mg= 1 tab(s), SL, q5min, PRN  Ozempic (0.25 mg or 0.5 mg dose) 2 mg/1.5 mL subcutaneous so, 1 dose, Subcutaneous, qWeek  Pepcid 20 mg oral tablet, 20 mg= 1 tab(s), Oral, BID, PRN  sertraline 50 mg oral tablet, 50 mg= 1 tab(s), Oral, Daily  Tessalon Perles, 100 mg= 1 cap(s), Oral, TID, PRN  Vistaril, 50 mg= 1 cap(s), Oral, At Bedtime, PRN  Xanax 0.25 mg oral  tablet, 0.25 mg= 1 tab(s), Oral, TID, PRN  Zofran 2 mg/mL injectable solution, 4 mg= 2 mL, IV Push, q4hr, PRN  Home  allopurinol 300 mg oral tablet, See Instructions, 1 refills  aspirin 81 mg oral Delayed Release (EC) tablet, 81 mg= 1 tab(s), Oral, Daily  atorvastatin 10 mg oral tablet, See Instructions, 1 refills  enalapril 5 mg oral tablet, See Instructions, 1 refills  Hold Plavix for now and resume once cleared by neurosurgery after repeat CT, See Instructions,? ?Investigating  Jardiance 25 mg oral tablet, 25 mg= 1 tab(s), Oral, Daily  metFORMIN 1000 mg oral tablet, See Instructions  Ozempic (0.25 mg or 0.5 mg dose) 2 mg/1.5 mL subcutaneous solution, 0.5 mg= 0.5 mg, Subcutaneous, qWeek  polyethylene glycol 3350 ( MiraLax ), 17 gm, Oral, Daily  sertraline 100 mg oral tablet, See Instructions, 1 refills  Allergies  No Known Allergies  No Known Medication Allergies  Social History  Abuse/Neglect  No, No, Yes, 03/30/2021  No, 03/23/2021  No, 03/09/2021  No, 12/21/2020  No, 06/17/2020  No, 12/17/2019  Alcohol  Current, Beer, Wine, Liquor, 1-2 times per week, 04/24/2018  Exercise  Exercise frequency: 1-2 times/week. Exercise type: TENNIS ., 04/24/2018  Tobacco  Never (less than 100 in lifetime), N/A, 03/30/2021  Never (less than 100 in lifetime), N/A, 03/23/2021  Never (less than 100 in lifetime), No, 03/23/2021  Never (less than 100 in lifetime), No, 03/09/2021  Never (less than 100 in lifetime), No, 12/21/2020  Never (less than 100 in lifetime), No, 06/17/2020  Never (less than 100 in lifetime), N/A, 12/17/2019  Never (less than 100 in lifetime), N/A, 03/28/2019  Never smoker, N/A, 11/28/2018  Family History  Alcoholism.: Sister.  Cardiac disorder: Grandfather, Grandmother and Uncle.  DM - Diabetes mellitus: Mother and Grandfather.  Depression.: Mother and Sister.  Heart attack: Grandfather and Grandmother.  Hypertension: Mother and Grandmother.  Primary malignant neoplasm of colon: Uncle.  Primary malignant neoplasm  of lung: Aunt.  Immunizations  Vaccine Date Status   zoster vaccine, inactivated 01/22/2021 Recorded   pneumococcal 13-valent conjugate vaccine 12/21/2020 Given   influenza virus vaccine, inactivated 10/14/2019 Recorded   influenza virus vaccine, inactivated 09/03/2018 Recorded   influenza virus vaccine, inactivated 11/15/2014 Recorded   influenza virus vaccine, inactivated 11/12/2013 Recorded   Lab Results  Test Name Test Result Date/Time   Sodium Lvl 143 mmol/L 03/31/2021 05:40 CDT   Potassium Lvl 3.9 mmol/L 03/31/2021 05:40 CDT   Chloride 105 mmol/L 03/31/2021 05:40 CDT   CO2 28 mmol/L 03/31/2021 05:40 CDT   Calcium Lvl 9.2 mg/dL 03/31/2021 05:40 CDT   Magnesium Lvl 2.12 mg/dL 03/31/2021 05:40 CDT   Glucose Lvl 153 mg/dL (High) 03/31/2021 05:40 CDT   BUN 21.1 mg/dL 03/31/2021 05:40 CDT   Creatinine 0.96 mg/dL 03/31/2021 05:40 CDT   Est Creat Clearance Ser 69.59 mL/min 03/31/2021 07:47 CDT   eGFR-AA >60 03/31/2021 05:40 CDT   eGFR-LISA >60 mL/min/1.73 m2 03/31/2021 05:40 CDT   Bili Total 0.8 mg/dL 03/31/2021 05:40 CDT   Bili Direct 0.4 mg/dL 03/31/2021 05:40 CDT   Bili Indirect 0.40 mg/dL 03/31/2021 05:40 CDT   AST 13 unit/L 03/31/2021 05:40 CDT   ALT 16 unit/L 03/31/2021 05:40 CDT   Alk Phos 68 unit/L 03/31/2021 05:40 CDT   Total Protein 6.4 gm/dL 03/31/2021 05:40 CDT   Albumin Lvl 3.6 gm/dL 03/31/2021 05:40 CDT   Globulin 2.8 gm/dL 03/31/2021 05:40 CDT   A/G Ratio 1.3 ratio 03/31/2021 05:40 CDT   Phosphorus 4.7 mg/dL 03/31/2021 05:40 CDT   Ferritin Lvl 291.47 ng/mL (High) 03/31/2021 05:40 CDT   Prealbumin 26.5 mg/dL 03/31/2021 05:40 CDT   Chol 96 mg/dL 03/31/2021 05:40 CDT   HDL 33 mg/dL (Low) 03/31/2021 05:40 CDT   Trig 102 mg/dL 03/31/2021 05:40 CDT   LDL 43.00 mg/dL (Low) 03/31/2021 05:40 CDT   Chol/HDL 3 03/31/2021 05:40 CDT   VLDL 20 03/31/2021 05:40 CDT   WBC 6.8 x10(3)/mcL 03/31/2021 05:40 CDT   RBC 4.68 x10(6)/mcL (Low) 03/31/2021 05:40 CDT   Hgb 14.8 gm/dL 03/31/2021 05:40 CDT   Hct 44.6 % 03/31/2021  05:40 CDT   Platelet 204 x10(3)/mcL 03/31/2021 05:40 CDT   MCV 95.3 fL (High) 03/31/2021 05:40 CDT   MCH 31.6 pg (High) 03/31/2021 05:40 CDT   MCHC 33.2 gm/dL 03/31/2021 05:40 CDT   RDW 12.7 % 03/31/2021 05:40 CDT   MPV 9.8 fL 03/31/2021 05:40 CDT   Abs Neut 4.45 x10(3)/mcL 03/31/2021 05:40 CDT   Neutro Auto 65.7 % 03/31/2021 05:40 CDT   Lymph Auto 21.4 % 03/31/2021 05:40 CDT   Mono Auto 8.4 % 03/31/2021 05:40 CDT   Eos Auto 3.2 % 03/31/2021 05:40 CDT   Abs Eos 0.22 x10(3)/mcL 03/31/2021 05:40 CDT   Basophil Auto 0.4 % 03/31/2021 05:40 CDT   Abs Neutro 4.45 x10(3)/mcL 03/31/2021 05:40 CDT   Abs Lymph 1.45 x10(3)/mcL 03/31/2021 05:40 CDT   Abs Mono 0.57 x10(3)/mcL 03/31/2021 05:40 CDT   Abs Baso 0.03 x10(3)/mcL 03/31/2021 05:40 CDT   NRBC% 0.0 % 03/31/2021 05:40 CDT   IG% 0.900 % (High) 03/31/2021 05:40 CDT   IG# 0.0600 (High) 03/31/2021 05:40 CDT   Diagnostic Results  (03/23/2021 16:01 CDT CT Head CODE FAST)  IMPRESSION:  1. Acute right basal ganglia 3.4 cm intraparenchymal hematoma. Mass  effect is local. No shift.  2. Millimetric right parietal intraparenchymal hemorrhage.  3. Otherwise no acute intracranial abnormalities.? [1]  ?  (03/23/2021 17:55 CDT MRI Brain W/O Contrast)  IMPRESSION:  Likely acute ischemia with hemorrhagic transformation right basal  ganglia with regional mass effect similar to recent prior CT [2]  ?  (03/23/2021 18:17 CDT MR Angio Head W/O Contrast)  IMPRESSION:  Normal MRA of the head [3]  ?  (03/24/2021 16:09 CDT CT Head W/O Contrast)  IMPRESSION:  1. Acute right basal ganglia 4 x 4.9 x 2.5 cm hematoma (coronal image  38; series 3 image 19), previously 3.4 x 3.9 x 2 cm, respectively.  Mass effect remains local. No shift.  2. No new sites of hemorrhage. No CT evidence of an acute territorial  infarct. No hydrocephalus. [4]  ?  (03/26/2021 04:33 CDT CT Head W/O Contrast)  IMPRESSION:  Stable right basal ganglia hematoma with stable mass effect. [5]  ?  (03/30/2021 12:21 CDT CT Head W/O  Contrast)  IMPRESSION:  Right basal ganglia hematoma is similar size with evolving blood  products. Associated mass effect similar 4 mm leftward shift.  Ventricles stable caliber. No new acute finding [6]     [1]?CT Head CODE FAST; Vitaliy Young MD 03/23/2021 16:01 CDT  [2]?MRI Brain W/O Contrast; Shana CHRISTIAN, Yaneli Burr 03/23/2021 17:55 CDT  [3]?MR Angio Head W/O Contrast; Bruce Finley MD 03/23/2021 18:17 CDT  [4]?CT Head W/O Contrast; eHctor CHRISTIAN, Vitaliy Arriola 03/24/2021 16:09 CDT  [5]?CT Head W/O Contrast; Hallie Jeffries MD 03/26/2021 04:33 CDT  [6]?CT Head W/O Contrast; Shana CHRISTIAN, Yaneli Burr 03/30/2021 12:21 CDT   dos 4/2/21  I have?seen and examined patient?in initial Physiatry consult  case & medical records reviewed  I have done? prescreen  ?history and PE are consistent with findings on prescreen  I have reviewed case with Isabel Tsang NP  Medical management by Dr Jaspreet MCKEON completed by Dr Vogel- I have reviewed and agree  PT,OT,ST,RT evaluations ordered and in progress  Med Reconciliation completed and reviewed in EHR  Patient remains appropriate for, in need of, should tolerate and benefit from IRF  ?

## 2022-05-03 NOTE — HISTORICAL [facility]
This is a historical note converted from Todd. Formatting and pictures may have been removed.  Please reference Todd for original formatting and attached multimedia. Chief Complaint  CPX/FASTING  History of Present Illness  Patient is here for ?his?annual wellness -?labs not done, fasting.?he presents with his wife, Tamica.?Denies any side effects to current medications.? Tolerating current medications and?feels that they are effective.? Denies change in social or family history.  ?   Depression - Patient currently feels controlled.  He continues to have a chronic?rash on his buttock.?  ?   Social Hx:??Marital Status:??, ?Children:?2 daughters, 5 grands,? Occupation: retired   ?   Specialists:  Neurologist: ?Jay.? Currently managing Parkinsons disease.  CARD: Zan.?He should be seeing him soon.  ENDO: Bacquet. Managing DM2.? A1c in September 5.6.  GI : Last Colonoscopy: 2013 with Dr. Lindsey, due 2023  DERM :? Meaux  ?  Vaccinations:  Influenza - 10/5/2021  COVID - Moderna 2/12/2021, 3/12/2021, 11/30/2021  Shingles - He remembers receiving one dose of Shingrix.  Pneumonia?- Prevnar 13?12/21/2021; Pneumovax 23 no history.  Review of Systems  General:??Denies weight change.??Denies fever,chills, night sweats, or weakness.??  Integument:?? See HPI.? Denies any nevus changes, urticaria,??or sores.??Also denies itching or areas of numbness.  HEENT:?? Denies vision changes or eye pain.??No sore throat, ear pain, sinus pressure or discharge.  Cardiovascular:?? Denies chest pain, palpitations, dyspnea on exertion, orthopnea.  Respiratory:?? No cough, wheezing, shortness of breath, or sputum.  GI:?? Denies nausea, emesis, constipation, diarrhea, melena, hematochezia or abdominal pain  :?? No frequency, urgency, hematuria, discharge, or incontinence  MS:?? Denies myalgias, arthralgias, joint effusion, edema, or weakness  Neuro:?? No headaches, numbness in extremities, tingling, dizziness, or  weakness  Psych:?? Denies anxiety, depression, suicidal or homicidal ideations, or irritability  Endo:?? Denies polyuria, polydipsia, polyphagia  Heme:?? No abnormal bleeding or bruising. No lymph node enlargement or pain.  ?  Physical Exam  Vitals & Measurements  HR:?64(Peripheral)? BP:?100/70?  HT:?172.72?cm? HT:?172.72?cm? WT:?62.000?kg? WT:?62?kg? BMI:?20.78?  General:?? Well-developed and?thin, no apparent distress, alert and oriented??4.  Integument:?? Skin is intact.? Mild?erythema and scarring present to inner buttock.??No pustules, vesicles, or scale. No Lymphadenopathy.??No urticaria.??No abnormal nevi.  HEENT:?? PERRLA_; TMs and EACs clear, normal turbinates with no erythema, normal mucosa, no sinus tenderness; no erythema or exudate of mouth or pharynx.  Neck:?? Supple, no lymphadenopathy, no thyromegaly, no bruits, no jugular venous distention.  Cardiovascular:?? Regular rhythm and rate, no murmurs, radial and dorsal pedal pulses 2+ bilaterally.  Respiratory:?? Lungs clear to auscultation bilaterally, no wheezes, no crackles, no rhonchi.??Good air movement.  Abdomen:?? NABS, soft, nontender, no hepatosplenomegaly, no masses, no guarding or rebound.?  MS:?? No CCE.?_  Neuro:?? CN II-XII intact_  Psych: Flat affect, patient calm, good historian, patient answers questions?appropriately. Good hygiene.  Heme:? No bruising or petechiae.  ?  Assessment/Plan  1.?Wellness examination?Z00.00  ?Age appropriate wellness topics discussed.? He is UTD with his screening colonoscopy.? He will continue to follow up with his specialists.  2.?Immunization due?Z23  He will check with his pharmacy regarding his second Shingrix vaccine.?  Pneumovax 23 vaccine after 12/20/21.  3.?HTN (hypertension)?I10  Controlled.  Lab?today: CBC, CMP, UA  4.?Hypercholesteremia?E78.00  Lab today: Lipid panel  5.?Contact dermatitis?L25.9  ?Lotrisone cream apply topically twice a day for more than 2 weeks as needed for rash.  6.?Prostate  cancer screening?Z12.5  Lab today: PSA  7.?Gout?M10.9  Asymptomatic.  Lab today: uric acid level  Referrals  Clinic Follow up, *Est. 06/21/22 9:00:00 CDT, 6 month FU, Order for future visit, Prostate cancer screening, [facility]   Problem List/Past Medical History  Ongoing  AR (allergic rhinitis)  CAD (coronary artery disease)  CVA (cerebrovascular accident)  Depression  DM (diabetes mellitus), type 2  Gout  HTN (hypertension)  Hypercholesteremia  Nocturia  Osteoarthritis of right shoulder  Wellness examination  Historical  No qualifying data  Procedure/Surgical History  Catheter placement in coronary artery(s) for coronary angiography, including intraprocedural injection(s) for coronary angiography, imaging supervision and interpretation; with left heart catheterization including intraprocedural injection(s) for left nish (03/09/2021)  Dilation of Coronary Artery, One Artery with Drug-eluting Intraluminal Device, Percutaneous Approach (03/09/2021)  Fluoroscopy of Left Heart using Low Osmolar Contrast (03/09/2021)  Fluoroscopy of Multiple Coronary Arteries using Low Osmolar Contrast (03/09/2021)  Measurement of Cardiac Sampling and Pressure, Left Heart, Percutaneous Approach (03/09/2021)  Perc drug-el cor stent sing (03/09/2021)  Cardiac stent placement (2021)  Extraction of tooth  Tooth implantation   Medications  allopurinol 300 mg oral tablet, See Instructions, 1 refills  atorvastatin 10 mg oral tablet, 10 mg= 1 tab(s), Oral, Daily, 1 refills  betamethasone-clotrimazole 0.05%-1% topical cream, 1 samantha, TOP, BID  carbidopa-levodopa 25 mg-100 mg oral tablet, 1 tab(s), Oral, TID  Fish Oil oral capsule, See Instructions  glimepiride 2 mg oral tablet, 2 mg= 1 tab(s), Oral, BID  Jardiance 25 mg oral tablet, 25 mg= 1 tab(s), Oral, Daily  metFORMIN 1000 mg oral tablet, 1000 mg= 1 tab(s), Oral, BID  Plavix 75 mg oral tablet, 75 mg= 1 tab(s), Oral, Daily  sertraline 50 mg oral tablet, 50 mg= 1 tab(s), Oral, TID  Allergies  No  Known Allergies  No Known Medication Allergies  Social History  Abuse/Neglect  No, 12/14/2021  No, No, Yes, 12/09/2021  Alcohol  Current, Wine, 1-2 times per month, 05/13/2021  Exercise  Exercise frequency: 1-2 times/week. Exercise type: TENNIS ., 04/24/2018  Substance Use  Never, 12/09/2021  Tobacco  Never (less than 100 in lifetime), No, 12/14/2021  Never (less than 100 in lifetime), N/A, 12/09/2021  Family History  Alcoholism.: Sister.  Cardiac disorder: Grandfather, Grandmother and Uncle.  DM - Diabetes mellitus: Mother and Grandfather.  Depression.: Mother and Sister.  Heart attack: Grandfather and Grandmother.  Hypertension: Mother and Grandmother.  Primary malignant neoplasm of colon: Uncle.  Primary malignant neoplasm of lung: Aunt.  Immunizations  Vaccine Date Status   COVID-19 mRNA, LNP-S, PF - Moderna 11/30/2021 Recorded   influenza virus vaccine, inactivated 10/05/2021 Recorded   COVID-19 mRNA, LNP-S, PF - Moderna 03/12/2021 Recorded   COVID-19 mRNA, LNP-S, PF - Moderna 02/12/2021 Recorded   zoster vaccine, inactivated 01/22/2021 Recorded   pneumococcal 13-valent conjugate vaccine 12/21/2020 Given   influenza virus vaccine, inactivated 10/14/2019 Recorded   influenza virus vaccine, inactivated 09/03/2018 Recorded   influenza virus vaccine, inactivated 11/15/2014 Recorded   influenza virus vaccine, inactivated 11/12/2013 Recorded   Health Maintenance  Health Maintenance  ???Pending?(in the next year)  ??? ??OverDue  ??? ? ? ?Alcohol Misuse Screening due??01/02/21??and every 1??year(s)  ??? ? ? ?Cognitive Screening due??01/02/21??and every 1??year(s)  ??? ??Due?  ??? ? ? ?Colorectal Screening due??12/21/21??Unknown Frequency  ??? ? ? ?Diabetes Maintenance-Eye Exam due??12/21/21??Unknown Frequency  ??? ? ? ?Diabetes Maintenance-Foot Exam due??12/21/21??Unknown Frequency  ??? ? ? ?Hypertension Management-Education due??12/21/21??and every 1??year(s)  ??? ? ? ?Pneumococcal Vaccine due??12/21/21??Unknown  Frequency  ??? ? ? ?Tetanus Vaccine due??12/21/21??and every 10??year(s)  ??? ? ? ?Zoster Vaccine due??12/21/21??Unknown Frequency  ??? ??Due In Future?  ??? ? ? ?Obesity Screening not due until??01/01/22??and every 1??year(s)  ??? ? ? ?Advance Directive not due until??01/02/22??and every 1??year(s)  ??? ? ? ?Fall Risk Assessment not due until??01/02/22??and every 1??year(s)  ??? ? ? ?Functional Assessment not due until??01/02/22??and every 1??year(s)  ??? ? ? ?Diabetes Maintenance-HgbA1c not due until??03/24/22??and every 1??year(s)  ??? ? ? ?ADL Screening not due until??03/26/22??and every 1??year(s)  ??? ? ? ?Aspirin Therapy for CVD Prevention not due until??04/10/22??and every 1??year(s)  ??? ? ? ?Blood Pressure Screening not due until??12/14/22??and every 1??year(s)  ??? ? ? ?Body Mass Index Check not due until??12/14/22??and every 1??year(s)  ??? ? ? ?Hypertension Management-Blood Pressure not due until??12/14/22??and every 1??year(s)  ??? ? ? ?Hypertension Management-BMP not due until??12/14/22??and every 1??year(s)  ??? ? ? ?Medicare Annual Wellness Exam not due until??12/14/22??and every 1??year(s)  ??? ? ? ?Diabetes Maintenance-Fasting Lipid Profile not due until??12/14/22??and every 1??year(s)  ??? ? ? ?Diabetes Maintenance-Serum Creatinine not due until??12/14/22??and every 1??year(s)  ???Satisfied?(in the past 1 year)  ??? ??Satisfied?  ??? ? ? ?ADL Screening on??03/26/21.??Satisfied by Solo WILKS, Yesica Perez  ??? ? ? ?Advance Directive on??12/09/21.??Satisfied by Rosa Hein LPN  ??? ? ? ?Aspirin Therapy for CVD Prevention on??04/10/21.??Satisfied by Letty Mercer LPN  ??? ? ? ?Blood Pressure Screening on??12/14/21.??Satisfied by Christina Vogel  ??? ? ? ?Body Mass Index Check on??12/14/21.??Satisfied by Christina Vogel  ??? ? ? ?Coronary Artery Disease Maintenance-Antiplatelet Agent Prescribed on??04/09/21.??Satisfied by Elida Gomez  ??? ? ? ?Depression Screening  on??12/09/21.??Satisfied by Rosa Hein LPN  ??? ? ? ?Diabetes Maintenance-Fasting Lipid Profile on??12/14/21.??Satisfied by Flaco Guerrero  ??? ? ? ?Diabetes Screening on??12/14/21.??Satisfied by Flaco Guerrero  ??? ? ? ?Fall Risk Assessment on??12/09/21.??Satisfied by Rosa Hein LPN  ??? ? ? ?Functional Assessment on??04/10/21.??Satisfied by Letty Mercer LPN  ??? ? ? ?Hypertension Management-BMP on??12/14/21.??Satisfied by Flaco Guerrero  ??? ? ? ?Influenza Vaccine on??10/05/21.??Satisfied by Natalia Heard NP  ??? ? ? ?Lipid Screening on??12/14/21.??Satisfied by Flaco Guerrero  ??? ? ? ?Medicare Annual Wellness Exam on??12/14/21.??Satisfied by Natalia Heard NP  ??? ? ? ?Obesity Screening on??12/14/21.??Satisfied by Christina Vogel  ??? ? ? ?Zoster Vaccine on??01/22/21.??Satisfied by Christopher Norris  ?      Patient condition discussed?in detail with nurse practitioner.??Agree with plan of care?and follow-up.  ?

## 2022-05-03 NOTE — HISTORICAL [facility]
This is a historical note converted from Todd. Formatting and pictures may have been removed.  Please reference Todd for original formatting and attached multimedia. Chief Complaint  CPX FASTING  History of Present Illness  Patient presents for annual wellness exam.? Denies any change in social history or family history.? Reports to be tolerating prescribed medicines well. Denies any side effects.??  , now completely retired. , 2 daughters, 5 grands  Exercise : tennis 1-2 times per week. Walks dog daily.? No nicotine, etoh : 2 drinks per week  Had Flu vaccine in October at? [facility].  Had Pneumovax several years ago.  Interested in shingles vaccine but does remember having the disease and interested in a titer.  ?   GI : Last Colonoscopy: 2013 with Dr. Lindsey, due 2023  OPTHAL :? Having yearly dilated eye exams.? Next exam 1/5/21. Has cataracts.  ENDO :? Bacquet. 8-5-19, appt in February.  DERM :? Latanya  Review of Systems  General:??????????????? Patient reports energy level is good.???Denies fever,chills, night sweats, fatigue or weakness.  Integument:???????? Denies any nevus changes, rashes, urticaria, ?or sores.? Also denies itching or areas of numbness.  HEENT:??????????????????Cataracts, may seek surgery in the near future. ?No sore throat, ear pain, sinus pressure or discharge.  Cardiovascular:??? Denies chest pain, palpitations, dyspnea on exertion, orthopnea.  Respiratory:???????? No cough, wheezing, shortness of breath, or sputum.  GI:????????????????????????? Denies nausea, emesis, constipation, diarrhea, melena, hematochezia or abdominal pain  :??????????????????????? No frequency, urgency, hematuria, discharge, or incontinence  MS:?????????????????????? Denies myalgias, arthralgias, joint effusion, edema, or weakness  Neuro:????????????????? No headaches, numbness in extremities, tingling, dizziness, or weakness  Psych:?????????????????? Denies anxiety, depression, suicidal  or homicidal ideations, or irritability  Endo:??????????????????? Denies polyuria, polydipsia, polyphagia  Heme:????????????????? No abnormal bleeding or bruising. No lymph node enlargement or pain.  Physical Exam  Vitals & Measurements  HR:?62(Peripheral)? BP:?123/74?  HT:?175.00?cm? HT:?175?cm? WT:?79.200?kg? WT:?79.2?kg? BMI:?25.86?  General :?????Well-developed and? nourished, no apparent distress, alert and oriented ?4  Integument :????? Skin is intact with no erythema.? No pustules or vesicles.? No rash or scale. No Lymphadenopathy.? No urticaria.? No abnormal nevi  HEENT :?????Cataracts present,?OMID, EOMI ; TMs and EACs clear, normal turbinates with no erythema, normal mucosa, no sinus tenderness; no erythema or exudate of mouth or pharynx  Neck :?????Supple, no lymphadenopathy, no thyromegaly, no bruits, no jugular venous distention  Cardiovascular :?????? Regular rhythm and rate, no murmurs, radial and dorsal pedal pulses 2+ bilaterally  Respiratory :??????Lungs clear to auscultation bilaterally, no wheezes, no crackles, no rhonchi.? Good air movement  Abdomen :?????? ?NABS, soft, nontender, no hepatosplenomegaly, no masses, no guarding or rebound??????????????????????????  MS :??????? Arthralgias with range of motion of several joints?but no effusion or erythema. ?FROM, negative SLR, no?CCE  Neuro :? ?????? CN II-XII intact, reflexes 2+ throughout, no motor sensory deficits, negative?cerebellar? tests  Psych :??????Normal affect, patient calm, good historian, patient answers questions??? appropriately.????Good hygiene? ??  Heme :?????? No bruising or petechiae?  ?   EKG:?Changes in?P wave in V1 and V2?compared with 2012  Assessment/Plan  1.?Wellness examination?Z00.00  ?Age-appropriate wellness topics discussed.? We will draw fasting labs and contact him with the results, will include a varicella titer.  2.?Abnormal EKG?R94.31  ?We will place a cardiology referral  3.?HTN  (hypertension)?I10  ?Controlled.? Refill enalapril 5 mg?for 6 months  4.?Hypercholesteremia?E78.00  ?We will check a CMP and lipids. ?Refilled atorvastatin 10 mg for 6 months.  5.?Depression?F32.9  ?Well-controlled. ?Will refill sertraline 100 mg for 6 months.  6.?Gout?M10.9  ?No recent flareup. ?Refill allopurinol 300 mg?for 6 months.  7.?Cataract?H26.9  8.?Immunization due?Z23  Administered Prevnar 13 vaccine? IM  9.?DM (diabetes mellitus), type 2?E11.9  10.?Arthralgia?M25.50  ?Refill meloxicam  11.?Nocturia?R35.1  ?Mild, will check?PSA.  Referrals  External Referral, EKG abnormal, Cardiology, 12/21/20 10:35:00 CST, Abnormal EKG  Clinic Follow up, *Est. 06/21/21 9:30:00 CDT, Order for future visit, Hypercholesteremia, [facility]   Problem List/Past Medical History  Ongoing  Depression  DM (diabetes mellitus), type 2  Gout  HTN (hypertension)  Hypercholesteremia  Nocturia  Wellness examination  Historical  No qualifying data  Procedure/Surgical History  Extraction of tooth  Tooth implantation   Medications  allopurinol 300 mg oral tablet, See Instructions, 1 refills  atorvastatin 10 mg oral tablet, See Instructions, 1 refills  enalapril 5 mg oral tablet, See Instructions, 1 refills  glimepiride 4 mg oral tablet, 4 mg= 1 tab(s), Oral, BID  Jardiance 25 mg oral tablet, 25 mg= 1 tab(s), Oral, Daily  Lotrisone 1%-0.05% topical cream, 1 samantha, TOP, BID, 2 refills  meloxicam 15 mg oral tablet, See Instructions, 1 refills  metFORMIN 1000 mg oral tablet, See Instructions  sertraline 100 mg oral tablet, See Instructions, 1 refills  sildenafil 100 mg oral tablet, 100 mg= 1 tab(s), Oral, Daily, 11 refills  Allergies  No Known Medication Allergies  Social History  Abuse/Neglect  No, 12/21/2020  No, 06/17/2020  No, 12/17/2019  Alcohol  Current, Beer, Wine, Liquor, 1-2 times per week, 04/24/2018  Exercise  Exercise frequency: 1-2 times/week. Exercise type: TENNIS ., 04/24/2018  Tobacco  Never (less than 100 in lifetime), No,  12/21/2020  Never (less than 100 in lifetime), No, 06/17/2020  Never (less than 100 in lifetime), N/A, 12/17/2019  Never (less than 100 in lifetime), N/A, 03/28/2019  Never smoker, N/A, 11/28/2018  Family History  Alcoholism.: Sister.  Cardiac disorder: Grandfather, Grandmother and Uncle.  DM - Diabetes mellitus: Mother and Grandfather.  Depression.: Mother and Sister.  Heart attack: Grandfather and Grandmother.  Hypertension: Mother and Grandmother.  Primary malignant neoplasm of colon: Uncle.  Primary malignant neoplasm of lung: Aunt.  Immunizations  Vaccine Date Status   pneumococcal 13-valent conjugate vaccine 12/21/2020 Given   influenza virus vaccine, inactivated 09/03/2018 Recorded   Health Maintenance  Health Maintenance  ???Pending?(in the next year)  ??? ??OverDue  ??? ? ? ?Advance Directive due??01/02/20??and every 1??year(s)  ??? ? ? ?Alcohol Misuse Screening due??01/02/20??and every 1??year(s)  ??? ? ? ?Cognitive Screening due??01/02/20??and every 1??year(s)  ??? ? ? ?Fall Risk Assessment due??01/02/20??and every 1??year(s)  ??? ? ? ?Functional Assessment due??01/02/20??and every 1??year(s)  ??? ? ? ?Influenza Vaccine due??10/01/20??and every 1??day(s)  ??? ??Due?  ??? ? ? ?ADL Screening due??12/26/20??and every 1??year(s)  ??? ? ? ?Aspirin Therapy for CVD Prevention due??12/26/20??and every 1??year(s)  ??? ? ? ?Colorectal Screening due??12/26/20??Unknown Frequency  ??? ? ? ?Diabetes Maintenance-Eye Exam due??12/26/20??Unknown Frequency  ??? ? ? ?Diabetes Maintenance-Foot Exam due??12/26/20??Unknown Frequency  ??? ? ? ?Hypertension Management-Education due??12/26/20??and every 1??year(s)  ??? ? ? ?Tetanus Vaccine due??12/26/20??and every 10??year(s)  ??? ? ? ?Zoster Vaccine due??12/26/20??Unknown Frequency  ??? ??Due In Future?  ??? ? ? ?Obesity Screening not due until??01/01/21??and every 1??year(s)  ??? ? ? ?Diabetes Maintenance-HgbA1c not due until??11/05/21??and every 1??year(s)  ??? ? ? ?Blood  Pressure Screening not due until??12/21/21??and every 1??year(s)  ??? ? ? ?Body Mass Index Check not due until??12/21/21??and every 1??year(s)  ??? ? ? ?Hypertension Management-BMP not due until??12/21/21??and every 1??year(s)  ??? ? ? ?Hypertension Management-Blood Pressure not due until??12/21/21??and every 1??year(s)  ??? ? ? ?Medicare Annual Wellness Exam not due until??12/21/21??and every 1??year(s)  ??? ? ? ?Diabetes Maintenance-Fasting Lipid Profile not due until??12/21/21??and every 1??year(s)  ??? ? ? ?Diabetes Maintenance-Serum Creatinine not due until??12/21/21??and every 1??year(s)  ???Satisfied?(in the past 1 year)  ??? ??Satisfied?  ??? ? ? ?Blood Pressure Screening on??12/21/20.??Satisfied by Sofi Duckworth CMA S.  ??? ? ? ?Body Mass Index Check on??12/21/20.??Satisfied by Sofi Duckworth CMA S.  ??? ? ? ?Diabetes Maintenance-Fasting Lipid Profile on??12/21/20.??Satisfied by Flaco Guerrero  ??? ? ? ?Diabetes Maintenance-HgbA1c on??11/05/20.??Satisfied by Olga Gutierrez  ??? ? ? ?Diabetes Maintenance-Microalbumin on??06/17/20.??Satisfied by Flaco Guerrero  ??? ? ? ?Diabetes Maintenance-Serum Creatinine on??12/21/20.??Satisfied by Flaco Guerrero  ??? ? ? ?Diabetes Screening on??12/21/20.??Satisfied by Flaco Guerrero  ??? ? ? ?Hypertension Management-BMP on??12/21/20.??Satisfied by Flaco Guerrero  ??? ? ? ?Hypertension Management-Blood Pressure on??12/21/20.??Satisfied by Sofi Duckworth CMA S.  ??? ? ? ?Lipid Screening on??12/21/20.??Satisfied by Flaco Guerrero  ??? ? ? ?Medicare Annual Wellness Exam on??12/21/20.??Satisfied by Osmel Sanford ?JOAQUIM CHRISTIAN  ??? ? ? ?Obesity Screening on??12/21/20.??Satisfied by Sofi Duckworth CMA  ??? ? ? ?Pneumococcal Vaccine on??12/21/20.??Satisfied by Sofi Duckworth CMA  ?

## 2022-06-21 PROBLEM — G20.A1 PARKINSON DISEASE: Status: ACTIVE | Noted: 2022-06-21

## 2022-06-21 PROBLEM — E78.00 HYPERCHOLESTEROLEMIA: Status: ACTIVE | Noted: 2022-06-21

## 2022-06-21 PROBLEM — I25.10 ARTERIOSCLEROSIS OF CORONARY ARTERY: Status: ACTIVE | Noted: 2022-06-21

## 2022-06-21 PROBLEM — I63.9 CEREBROVASCULAR ACCIDENT (CVA): Status: ACTIVE | Noted: 2022-06-21

## 2022-06-21 PROBLEM — R35.1 NOCTURIA: Status: ACTIVE | Noted: 2022-06-21

## 2022-06-21 PROBLEM — I10 HYPERTENSION: Status: ACTIVE | Noted: 2022-06-21

## 2022-06-21 PROBLEM — F32.A DEPRESSIVE DISORDER: Status: ACTIVE | Noted: 2022-06-21

## 2022-06-21 PROBLEM — M10.9 GOUT: Status: ACTIVE | Noted: 2022-06-21

## 2022-06-21 PROBLEM — E11.9 TYPE 2 DIABETES MELLITUS: Status: ACTIVE | Noted: 2022-06-21

## 2022-06-22 ENCOUNTER — OFFICE VISIT (OUTPATIENT)
Dept: NEUROLOGY | Facility: CLINIC | Age: 72
End: 2022-06-22
Payer: MEDICARE

## 2022-06-22 VITALS
BODY MASS INDEX: 20.94 KG/M2 | WEIGHT: 138.19 LBS | SYSTOLIC BLOOD PRESSURE: 100 MMHG | HEIGHT: 68 IN | DIASTOLIC BLOOD PRESSURE: 70 MMHG | HEART RATE: 60 BPM

## 2022-06-22 DIAGNOSIS — G20.A1 PARKINSON DISEASE: Primary | ICD-10-CM

## 2022-06-22 DIAGNOSIS — I63.9 CEREBROVASCULAR ACCIDENT (CVA), UNSPECIFIED MECHANISM: ICD-10-CM

## 2022-06-22 PROCEDURE — 99213 PR OFFICE/OUTPT VISIT, EST, LEVL III, 20-29 MIN: ICD-10-PCS | Mod: S$PBB,,, | Performed by: NURSE PRACTITIONER

## 2022-06-22 PROCEDURE — 99999 PR PBB SHADOW E&M-EST. PATIENT-LVL III: CPT | Mod: PBBFAC,,, | Performed by: NURSE PRACTITIONER

## 2022-06-22 PROCEDURE — 99213 OFFICE O/P EST LOW 20 MIN: CPT | Mod: S$PBB,,, | Performed by: NURSE PRACTITIONER

## 2022-06-22 PROCEDURE — 99999 PR PBB SHADOW E&M-EST. PATIENT-LVL III: ICD-10-PCS | Mod: PBBFAC,,, | Performed by: NURSE PRACTITIONER

## 2022-06-22 PROCEDURE — 99213 OFFICE O/P EST LOW 20 MIN: CPT | Mod: PBBFAC | Performed by: NURSE PRACTITIONER

## 2022-06-22 NOTE — PROGRESS NOTES
Subjective:       Patient ID: Asher Villa is a 71 y.o. male.    Chief Complaint: Parkinson disease (Recent fall difficulty pulling himself up states he has weak core strength; sleeping well; tremor to right hand still present difficulty with eating and writing due to tremor; difficulty with fine motor skills difficulty undoing buttons and doing things with both hands; weakness to left leg ) and Stroke (Denies stroke like symptoms since last visit )      History of Present Illness:  Follow-up visit for Parkinson's.  He is here today with his wife.  He is currently on Sinemet 1 tablet t.i.d..  Is unsure if he really has noticed any benefit since being on the medication.  The wife also states she is unsure if they have noticed much benefit.  Patient states he notices tremor in his right hand predominantly.  This is most bothersome to him and interferes with eating as well as writing.  The wife also notes that his steps are short and he seems to drag his leg on the left.  No falls since last visit.  No new symptoms including vision change, focal weakness, focal paresthesias.      Review of Systems   See HPI        Outpatient Encounter Medications as of 6/22/2022   Medication Sig Dispense Refill    allopurinoL (ZYLOPRIM) 300 MG tablet Take 1 tablet (300 mg total) by mouth once daily. 90 tablet 1    atorvastatin (LIPITOR) 10 MG tablet Take 1 tablet (10 mg total) by mouth once daily. 90 tablet 1    carbidopa-levodopa  mg (SINEMET)  mg per tablet Take 1 tablet by mouth 3 (three) times daily.      clopidogreL (PLAVIX) 75 mg tablet Take 75 mg by mouth once daily.      clotrimazole-betamethasone 1-0.05% (LOTRISONE) cream Apply topically.      glimepiride (AMARYL) 2 MG tablet Take 4 mg by mouth 2 (two) times a day.      JARDIANCE 25 mg tablet Take 25 mg by mouth once daily.      metFORMIN (GLUCOPHAGE) 1000 MG tablet TAKE 1 TABLET BY MOUTH TWICE A DAY WITH A MEAL      sertraline (ZOLOFT) 100 MG tablet Take  "1.5 tablets (150 mg total) by mouth once daily. 135 tablet 1    [DISCONTINUED] allopurinoL (ZYLOPRIM) 300 MG tablet TAKE 1 TABLET BY MOUTH EVERY DAY 90 tablet 1    [DISCONTINUED] atorvastatin (LIPITOR) 10 MG tablet Take 10 mg by mouth once daily.      [DISCONTINUED] sertraline (ZOLOFT) 100 MG tablet Take 50 mg by mouth 3 (three) times daily.       No facility-administered encounter medications on file as of 6/22/2022.      Objective:   /70   Pulse 60   Ht 5' 8" (1.727 m)   Wt 62.7 kg (138 lb 3.2 oz)   BMI 21.01 kg/m²          Physical Exam   General:  Alert and oriented  NAD  No overt edema    Cognition and Comprehension:  Speech and language intact  Follows commands    hypomimia  hypophonia    Cranial nerves:   CN 2_ Visual fields (full to confrontation both eyes)  CN 3, 4, 6_ Intact,, no nystagmus  CN 7_no face asymmetry;   CN 8_hearing intact to spoken voice  CN 9, 10, 11_voice normal, shoulder shrug ok; deltoids not fatigable     Muscle Strength and Tone:  Normal upper extremity tone  Normal lower extremity tone  LUE 4+/5, RUE strength normal  Normal lower extremity strength    Right hand rest tremor mild  Mild RUE bradykinesia and tapering, bradykinesia with toe tap on the right  cogwheel Rigidity RLE>RUE    Coordination and Gait:  Stooped posture, decreased arm swing on the left, stride length normal        Assessment & Plan:      1. Parkinson disease  Overview:  This is a man who suffered a right basal ganglia intracranial hemorrhage in March 2021. While in the hospital, general Neurology Services were consulted for tremor.  At his office follow-up, the diagnosis of Parkinson's disease was made.  He was started on carbidopa levodopa at that time.    Assessment & Plan:  -He has been taking his Sinemet with high-protein meals.  Instructed to take Sinemet dosing at least 30 minutes before meals.  If he does not notice any improvement in his tremor, they have been instructed to try 1.5 tablets " t.i.d.      2. Cerebrovascular accident (CVA), unspecified mechanism  Overview:  The patient had coronary stent placement on 03/07/2021 and was started on DAPT.  On 03/23/2021, he presented to ED with complaints of left-sided weakness, left facial droop, and slurred speech.  He was found to have an acute right basal ganglia 3.4 cm intraparenchymal hematoma with underlying edema and local mass effect, but no midline shift.  MR angio head and neck were unremarkable.  He saw Dr. Lopez inpatient and in FU.  She felt etiology was HTN.  He now is on Plavix only.  Also on lipitor    Assessment & Plan:  Hemorrhagic.  BP at goal  Continue Plavix and Lipitor          This note was created with the assistance of voice recognition software. There may be transcription errors as a result of using this technology however minimal. Effort has been made to assure accuracy of transcription but any obvious errors or omissions should be clarified with the author of the document.

## 2022-06-22 NOTE — ASSESSMENT & PLAN NOTE
-He has been taking his Sinemet with high-protein meals.  Instructed to take Sinemet dosing at least 30 minutes before meals.  If he does not notice any improvement in his tremor, they have been instructed to try 1.5 tablets t.i.d.

## 2022-12-21 PROBLEM — M19.011 OSTEOARTHRITIS OF RIGHT SHOULDER: Status: ACTIVE | Noted: 2022-12-21

## 2022-12-21 PROBLEM — J30.9 ALLERGIC RHINITIS: Status: ACTIVE | Noted: 2022-12-21

## 2023-02-01 ENCOUNTER — OFFICE VISIT (OUTPATIENT)
Dept: NEUROLOGY | Facility: CLINIC | Age: 73
End: 2023-02-01
Payer: MEDICARE

## 2023-02-01 VITALS
SYSTOLIC BLOOD PRESSURE: 94 MMHG | WEIGHT: 135 LBS | DIASTOLIC BLOOD PRESSURE: 70 MMHG | BODY MASS INDEX: 20.46 KG/M2 | HEIGHT: 68 IN | HEART RATE: 72 BPM

## 2023-02-01 DIAGNOSIS — G20.A1 PARKINSON DISEASE: ICD-10-CM

## 2023-02-01 PROCEDURE — 99999 PR PBB SHADOW E&M-EST. PATIENT-LVL III: ICD-10-PCS | Mod: PBBFAC,,, | Performed by: PSYCHIATRY & NEUROLOGY

## 2023-02-01 PROCEDURE — 99214 OFFICE O/P EST MOD 30 MIN: CPT | Mod: S$PBB,,, | Performed by: PSYCHIATRY & NEUROLOGY

## 2023-02-01 PROCEDURE — 99213 OFFICE O/P EST LOW 20 MIN: CPT | Mod: PBBFAC | Performed by: PSYCHIATRY & NEUROLOGY

## 2023-02-01 PROCEDURE — 99999 PR PBB SHADOW E&M-EST. PATIENT-LVL III: CPT | Mod: PBBFAC,,, | Performed by: PSYCHIATRY & NEUROLOGY

## 2023-02-01 PROCEDURE — 99214 PR OFFICE/OUTPT VISIT, EST, LEVL IV, 30-39 MIN: ICD-10-PCS | Mod: S$PBB,,, | Performed by: PSYCHIATRY & NEUROLOGY

## 2023-02-01 RX ORDER — CARBIDOPA AND LEVODOPA 25; 100 MG/1; MG/1
2 TABLET ORAL 3 TIMES DAILY
Qty: 540 TABLET | Refills: 3 | Status: SHIPPED | OUTPATIENT
Start: 2023-02-01 | End: 2024-04-01

## 2023-02-01 NOTE — PROGRESS NOTES
Chief Complaint   Patient presents with    Parkinson disease     Pt denies progression since last visit weakness to left leg and left hand denies increased weakness, denies progression of tremor, sleeping well is frequently tired through the day, denies falls; currently sinemet 1.5 tab TID has not noticed much improvement but states he does not take dose consistently     Stroke     Denies stroke like symptoms since last OV         This is a 72 y.o. male here for follow up for Parkinson's disease.  Also history of right basal ganglia hemorrhage.  Pt denies progression since last visit.  weakness to left leg and left hand denies increased weakness, denies progression of tremor, sleeping well is frequently tired through the day, denies falls; currently sinemet 1.5 tab TID has not noticed much improvement but states he does not take dose consistently.  Having difficulty scheduling administration of medication with meals.  Has not noticed that it helped with his ability to use utensils.  Tremor is still bothersome while eating.    Medication List with Changes/Refills   Current Medications    ALLOPURINOL (ZYLOPRIM) 300 MG TABLET    Take 1 tablet (300 mg total) by mouth once daily.    ATORVASTATIN (LIPITOR) 10 MG TABLET    Take 1 tablet (10 mg total) by mouth once daily.    CLOPIDOGREL (PLAVIX) 75 MG TABLET    Take 75 mg by mouth once daily.    CLOTRIMAZOLE-BETAMETHASONE 1-0.05% (LOTRISONE) CREAM    Apply topically.    GLIMEPIRIDE (AMARYL) 2 MG TABLET    Take 4 mg by mouth 2 (two) times a day.    JARDIANCE 25 MG TABLET    Take 25 mg by mouth once daily.    METFORMIN (GLUCOPHAGE) 1000 MG TABLET    TAKE 1 TABLET BY MOUTH TWICE A DAY WITH A MEAL    SERTRALINE (ZOLOFT) 100 MG TABLET    Take 1.5 tablets (150 mg total) by mouth once daily.   Changed and/or Refilled Medications    Modified Medication Previous Medication    CARBIDOPA-LEVODOPA  MG (SINEMET)  MG PER TABLET carbidopa-levodopa  mg (SINEMET)  mg  per tablet       Take 2 tablets by mouth 3 (three) times daily.    Take 1 tablet by mouth 3 (three) times daily.        Vitals:    02/01/23 1115   BP: 94/70   Pulse: 72        Physical Exam   General:  Alert and oriented  NAD  No overt edema    Cognition and Comprehension:  Speech and language intact  Follows commands    hypomimia  hypophonia    Cranial nerves:   CN 2_ Visual fields (full to confrontation both eyes)  CN 3, 4, 6_ Intact,, no nystagmus  CN 7_no face asymmetry;   CN 8_hearing intact to spoken voice  CN 9, 10, 11_voice normal, shoulder shrug ok; deltoids not fatigable     Muscle Strength and Tone:  Normal upper extremity tone  Normal lower extremity tone  LUE 4+/5, RUE strength normal  Normal lower extremity strength    Right hand rest tremor mild  Mild RUE bradykinesia and tapering, bradykinesia with toe tap on the right  cogwheel Rigidity RLE>RUE    Coordination and Gait:  Stooped posture, decreased arm swing on the left, stride length normal       1. Parkinson disease  Overview:  This is a man who suffered a right basal ganglia intracranial hemorrhage in March 2021. While in the hospital, general Neurology Services were consulted for tremor.  At his office follow-up, the diagnosis of Parkinson's disease was made.  He was started on carbidopa levodopa at that time.    Orders:  -     carbidopa-levodopa  mg (SINEMET)  mg per tablet; Take 2 tablets by mouth 3 (three) times daily.  Dispense: 540 tablet; Refill: 3       Increase Sinemet to 2 tabs t.i.d. I, if this is not helpful and patient calls consider switch to [facility]

## 2023-08-01 ENCOUNTER — OFFICE VISIT (OUTPATIENT)
Dept: NEUROLOGY | Facility: CLINIC | Age: 73
End: 2023-08-01
Payer: MEDICARE

## 2023-08-01 VITALS
BODY MASS INDEX: 21.07 KG/M2 | DIASTOLIC BLOOD PRESSURE: 58 MMHG | SYSTOLIC BLOOD PRESSURE: 96 MMHG | WEIGHT: 139 LBS | HEART RATE: 70 BPM | HEIGHT: 68 IN

## 2023-08-01 DIAGNOSIS — I63.9 CEREBROVASCULAR ACCIDENT (CVA), UNSPECIFIED MECHANISM: ICD-10-CM

## 2023-08-01 DIAGNOSIS — G20.A1 PARKINSON DISEASE: Primary | ICD-10-CM

## 2023-08-01 PROCEDURE — 99213 OFFICE O/P EST LOW 20 MIN: CPT | Mod: PBBFAC | Performed by: NURSE PRACTITIONER

## 2023-08-01 PROCEDURE — 99999 PR PBB SHADOW E&M-EST. PATIENT-LVL III: ICD-10-PCS | Mod: PBBFAC,,, | Performed by: NURSE PRACTITIONER

## 2023-08-01 PROCEDURE — 99215 OFFICE O/P EST HI 40 MIN: CPT | Mod: S$PBB,,, | Performed by: NURSE PRACTITIONER

## 2023-08-01 PROCEDURE — 99215 PR OFFICE/OUTPT VISIT, EST, LEVL V, 40-54 MIN: ICD-10-PCS | Mod: S$PBB,,, | Performed by: NURSE PRACTITIONER

## 2023-08-01 PROCEDURE — 99999 PR PBB SHADOW E&M-EST. PATIENT-LVL III: CPT | Mod: PBBFAC,,, | Performed by: NURSE PRACTITIONER

## 2023-08-01 RX ORDER — VITAMIN B COMPLEX
1 CAPSULE ORAL DAILY
COMMUNITY

## 2023-08-01 RX ORDER — AMOXICILLIN 500 MG
1 CAPSULE ORAL DAILY
COMMUNITY

## 2023-08-01 NOTE — PROGRESS NOTES
Subjective:       Patient ID: Asher Villa is a 73 y.o. male.    Chief Complaint: Parkinson disease (Tremor to right hand is still the same. Has trouble writing and reaching his mouth with  utensils in mouth. Has to use both hands to hold coffee cup. Balance is not great. Has  to walk slow to prevent from falling. Denies any falls. States very careful when walking. Does not have much muscle strength. Has difficulty dressing. Ex. putting his pants on. )      History of Present Illness:  Follow-up visit for Parkinson's disease.  He is here today with his wife.  He says he is overall about the same since last visit.  His right hand tremor continues to significantly affect his handwriting in his ability to feed himself.  He says it is not overall bothersome to him apart from when he is going out in social settings.  He is limited to what he can order off of the menu based on how easy it will be for him to eat with his tremor.  He continues to have weakness in the left hand and left leg as a result of his history of right basal ganglia hemorrhage.  He has not had any falls.  At last visit, his Sinemet was increased to 2 tabs t.i.d., but he did not really notice any benefit with the increase.  Admittedly, he still sometimes will forget doses.  He denies any orthostasis, hallucinations, dyskinesias.        Review of Systems  I have reviewed a 12 point review of systems which is negative unless otherwise stated in HPI        Past Medical History:   Diagnosis Date    Allergic rhinitis     CAD (coronary artery disease)     Diabetes mellitus     Gout     Hypercholesteremia     Hypertension     Nocturia     Osteoarthritis     Stroke        Past Surgical History:   Procedure Laterality Date    Cardiac stent placement (2021)      Catheter placement in coronary artery      Catheter placement in coronary artery(s) for coronary angiography, including intraprocedural injection(s) for coronary angiography, with left heart  catheterization including intraprocedural injection(s) for left nish (03/09/2021)      CORONARY ANGIOPLASTY WITH STENT PLACEMENT      Dilation of coronary Artery      Dilation of Coronary Artery, One Artery with Drug-eluting Intraluminal Device, Percutaneous Approach (03/09/2021)      EXTRACTION OF TOOTH      Fluoroscopy of Left Heart using Low Osmolar Contrast (03/09/2021)      Fluoroscopy of Multiple Coronary Arteries using Low Osmolar Contrast (03/09/2021)      Measurement of Cardiac Sampling and Pressure, Left Heart, Percutaneous Approach (03/09/2021)      Perc drug-el cor stent sing (03/09/2021)      Tooth implantation          Family History   Problem Relation Age of Onset    Diabetes Mother     Depression Mother     Hypertension Mother     Alcohol abuse Sister     Depression Sister         Social History     Socioeconomic History    Marital status:     Number of children: 2   Occupational History    Occupation: retired    Tobacco Use    Smoking status: Never    Smokeless tobacco: Never   Substance and Sexual Activity    Alcohol use: Yes     Alcohol/week: 1.0 standard drink of alcohol     Types: 1 Glasses of wine per week     Comment: Once a month    Drug use: Never   Social History Narrative    2 daughters, 5 grands        Outpatient Encounter Medications as of 8/1/2023   Medication Sig Dispense Refill    allopurinoL (ZYLOPRIM) 300 MG tablet Take 1 tablet (300 mg total) by mouth once daily. 90 tablet 2    atorvastatin (LIPITOR) 10 MG tablet Take 1 tablet (10 mg total) by mouth once daily. 90 tablet 2    b complex vitamins capsule Take 1 capsule by mouth once daily.      carbidopa-levodopa  mg (SINEMET)  mg per tablet Take 2 tablets by mouth 3 (three) times daily. 540 tablet 3    clopidogreL (PLAVIX) 75 mg tablet Take 75 mg by mouth once daily.      clotrimazole-betamethasone 1-0.05% (LOTRISONE) cream Apply topically as needed.      glimepiride (AMARYL) 2 MG tablet Take 1 mg by mouth  "Daily. Now taking 1/2 tab daily      metFORMIN (GLUCOPHAGE) 1000 MG tablet TAKE 1 TABLET BY MOUTH TWICE A DAY WITH A MEAL      nystatin (MYCOSTATIN) cream as needed.      omega-3 fatty acids/fish oil (FISH OIL-OMEGA-3 FATTY ACIDS) 300-1,000 mg capsule Take 1 capsule by mouth once daily.      sertraline (ZOLOFT) 100 MG tablet Take 1.5 tablets (150 mg total) by mouth once daily. (Patient taking differently: Take 50 mg by mouth 3 (three) times daily.) 135 tablet 2     No facility-administered encounter medications on file as of 8/1/2023.      Objective:   BP (!) 96/58 (BP Location: Right arm)   Pulse 70   Ht 5' 8" (1.727 m)   Wt 63 kg (139 lb)   BMI 21.13 kg/m²        Physical Exam  General:  Alert and oriented  NAD  No overt edema    Cognition and Comprehension:  Speech and language intact  Follows commands    hypomimia  hypophonia    Cranial nerves:   CN 2_ Visual fields (full to confrontation both eyes)  CN 3, 4, 6_ Intact,, no nystagmus  CN 7_no face asymmetry;   CN 8_hearing intact to spoken voice  CN 9, 10, 11_voice normal, shoulder shrug ok; deltoids not fatigable     Muscle Strength and Tone:  Normal upper extremity tone  Normal lower extremity tone  LUE 4+/5, RUE strength normal  Normal lower extremity strength    Right hand rest tremor mild  Mild RUE bradykinesia and tapering, bradykinesia with toe tap on the right  cogwheel Rigidity RLE>RUE    Coordination and Gait:  Stooped posture, decreased arm swing on the left, short stride length today      Assessment & Plan:      1. Parkinson disease  Overview:  This is a man who suffered a right basal ganglia intracranial hemorrhage in March 2021. While in the hospital, general Neurology Services were consulted for tremor.  At his office follow-up, the diagnosis of Parkinson's disease was made.  He was started on carbidopa levodopa at that time.    Assessment & Plan:  -his tremor is really only bothersome when he is in social settings.  He is going to try taking " 3 tablets of Sinemet tonight before going to dinner.  If this is helpful for him, he can just take 3 tablets before any type of social event.  If not effective, we have already discussed the possibility of adding Azilect      2. Cerebrovascular accident (CVA), unspecified mechanism  Overview:  The patient had coronary stent placement on 03/07/2021 and was started on DAPT.  On 03/23/2021, he presented to ED with complaints of left-sided weakness, left facial droop, and slurred speech.  He was found to have an acute right basal ganglia 3.4 cm intraparenchymal hematoma with underlying edema and local mass effect, but no midline shift.  MR angio head and neck were unremarkable.  He saw Dr. Lopez inpatient and in FU.  She felt etiology was HTN.  He now is on Plavix only.  Also on lipitor        Prior to the patient's arrival on the same day, I spent 5 minutes reviewing chart. Once in the exam room with the patient, I spent 30 minutes in the room with the member performing a history and exam as well as reviewing the test results and recommendations with the patient. After leaving the exam room, I spend an additional 5 minutes completing the electronic health record. The total time spent  that day caring for the member is 40 minutes, and this time--including the breakdown--is documented in the medical record.     This note was created with the assistance of voice recognition software. There may be transcription errors as a result of using this technology however minimal. Effort has been made to assure accuracy of transcription but any obvious errors or omissions should be clarified with the author of the document.

## 2023-08-02 NOTE — ASSESSMENT & PLAN NOTE
-his tremor is really only bothersome when he is in social settings.  He is going to try taking 3 tablets of Sinemet tonight before going to dinner.  If this is helpful for him, he can just take 3 tablets before any type of social event.  If not effective, we have already discussed the possibility of adding Azilect

## 2023-12-28 PROBLEM — I69.154 HEMIPLEGIA AND HEMIPARESIS FOLLOWING NONTRAUMATIC INTRACEREBRAL HEMORRHAGE AFFECTING LEFT NON-DOMINANT SIDE: Status: ACTIVE | Noted: 2023-12-28

## 2024-03-30 DIAGNOSIS — G20.A1 PARKINSON DISEASE: ICD-10-CM

## 2024-04-01 RX ORDER — CARBIDOPA AND LEVODOPA 25; 100 MG/1; MG/1
2 TABLET ORAL 3 TIMES DAILY
Qty: 540 TABLET | Refills: 3 | Status: SHIPPED | OUTPATIENT
Start: 2024-04-01

## 2024-04-29 ENCOUNTER — OFFICE VISIT (OUTPATIENT)
Dept: NEUROLOGY | Facility: CLINIC | Age: 74
End: 2024-04-29
Payer: MEDICARE

## 2024-04-29 VITALS
BODY MASS INDEX: 21.16 KG/M2 | SYSTOLIC BLOOD PRESSURE: 100 MMHG | HEIGHT: 68 IN | HEART RATE: 60 BPM | DIASTOLIC BLOOD PRESSURE: 76 MMHG | WEIGHT: 139.63 LBS

## 2024-04-29 DIAGNOSIS — I63.9 CEREBROVASCULAR ACCIDENT (CVA), UNSPECIFIED MECHANISM: ICD-10-CM

## 2024-04-29 DIAGNOSIS — G20.A1 PARKINSON'S DISEASE, UNSPECIFIED WHETHER DYSKINESIA PRESENT, UNSPECIFIED WHETHER MANIFESTATIONS FLUCTUATE: ICD-10-CM

## 2024-04-29 DIAGNOSIS — I69.154 HEMIPLEGIA AND HEMIPARESIS FOLLOWING NONTRAUMATIC INTRACEREBRAL HEMORRHAGE AFFECTING LEFT NON-DOMINANT SIDE: Primary | ICD-10-CM

## 2024-04-29 PROCEDURE — 99213 OFFICE O/P EST LOW 20 MIN: CPT | Mod: PBBFAC | Performed by: PSYCHIATRY & NEUROLOGY

## 2024-04-29 PROCEDURE — 99999 PR PBB SHADOW E&M-EST. PATIENT-LVL III: CPT | Mod: PBBFAC,,, | Performed by: PSYCHIATRY & NEUROLOGY

## 2024-04-29 PROCEDURE — 99214 OFFICE O/P EST MOD 30 MIN: CPT | Mod: S$PBB,,, | Performed by: PSYCHIATRY & NEUROLOGY

## 2024-04-29 NOTE — PROGRESS NOTES
Chief Complaint   Patient presents with    Parkinsons Disease     Pt and wife states he seems about the same denies falls, currently sinemet 2 tabs TID         This is a 73 y.o. male here for follow up for Parkinson's disease.  Patient doing well.  Taking Sinemet 2 tabs t.i.d..  He recalls trying going up to 3 tabs on a few occasions but not noticing much difference.  He has no longer driving.  He feels he is slower at everything.  Memory is okay although he does have difficulty recalling events on occasion.  Recall patient has history of stroke in his unsure if that is the cause of his issues with memory at times.    Medication List with Changes/Refills   Current Medications    ALLOPURINOL (ZYLOPRIM) 300 MG TABLET    Take 1 tablet (300 mg total) by mouth once daily.    ATORVASTATIN (LIPITOR) 10 MG TABLET    Take 1 tablet (10 mg total) by mouth once daily.    B COMPLEX VITAMINS CAPSULE    Take 1 capsule by mouth once daily.    CARBIDOPA-LEVODOPA  MG (SINEMET)  MG PER TABLET    TAKE TWO TABLETS BY MOUTH THREE TIMES DAILY    CLOPIDOGREL (PLAVIX) 75 MG TABLET    Take 75 mg by mouth once daily.    CLOTRIMAZOLE-BETAMETHASONE 1-0.05% (LOTRISONE) CREAM    Apply topically as needed.    GLIMEPIRIDE (AMARYL) 2 MG TABLET    Take 0.5 tablets by mouth Daily.    METFORMIN (GLUCOPHAGE) 1000 MG TABLET    TAKE 1 TABLET BY MOUTH TWICE A DAY WITH A MEAL    NYSTATIN (MYCOSTATIN) CREAM    as needed.    OMEGA-3 FATTY ACIDS/FISH OIL (FISH OIL-OMEGA-3 FATTY ACIDS) 300-1,000 MG CAPSULE    Take 1 capsule by mouth once daily.    SERTRALINE (ZOLOFT) 100 MG TABLET    Take 1.5 tablets (150 mg total) by mouth once daily.        Vitals:    04/29/24 1416   BP: 100/76   Pulse: 60        General:  Alert and oriented  NAD  No overt edema    Cognition and Comprehension:  Speech and language intact  Follows commands    hypomimia  hypophonia    Cranial nerves:   CN 2_ Visual fields (full to confrontation both eyes)  CN 3, 4, 6_ Intact,, no  nystagmus  CN 7_no face asymmetry;   CN 8_hearing intact to spoken voice  CN 9, 10, 11_voice normal, shoulder shrug ok; deltoids not fatigable     Muscle Strength and Tone:  Normal upper extremity tone  Normal lower extremity tone  LUE 4+/5, RUE strength normal  Normal lower extremity strength    Right hand rest tremor mild  Mild RUE bradykinesia and tapering, bradykinesia with toe tap on the right  cogwheel Rigidity RLE>RUE    Coordination and Gait:  Stooped posture, decreased arm swing on the left, short stride length today       1. 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 Parkinson's disease, unspecified whether dyskinesia present, unspecified whether manifestations fluctuate  Overview:  This is a man who suffered a right basal ganglia intracranial hemorrhage in March 2021. While in the hospital, general Neurology Services were consulted for tremor.  At his office follow-up, the diagnosis of Parkinson's disease was made.  He was started on carbidopa levodopa at that time.      3. Cerebrovascular accident (CVA), unspecified mechanism  Overview:  The patient had coronary stent placement on 03/07/2021 and was started on DAPT.  On 03/23/2021, he presented to ED with complaints of left-sided weakness, left facial droop, and slurred speech.  He was found to have an acute right basal ganglia 3.4 cm intraparenchymal hematoma with underlying edema and local mass effect, but no midline shift.  MR angio head and neck were unremarkable.  He saw Dr. Lopez inpatient and in FU.  She felt etiology was HTN.  He now is on Plavix only.  Also on lipitor       Cont sinemet 2 tabs TID  Rec [facility] PT patient will think about it

## 2024-04-30 ENCOUNTER — HOSPITAL ENCOUNTER (EMERGENCY)
Facility: HOSPITAL | Age: 74
Discharge: HOME | End: 2024-04-30
Attending: STUDENT IN AN ORGANIZED HEALTH CARE EDUCATION/TRAINING PROGRAM
Payer: MEDICARE

## 2024-04-30 VITALS
TEMPERATURE: 98 F | SYSTOLIC BLOOD PRESSURE: 118 MMHG | RESPIRATION RATE: 13 BRPM | DIASTOLIC BLOOD PRESSURE: 70 MMHG | OXYGEN SATURATION: 97 % | HEIGHT: 68 IN | HEART RATE: 63 BPM | BODY MASS INDEX: 21.07 KG/M2 | WEIGHT: 139 LBS
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
## 2024-04-30 DIAGNOSIS — S09.90XA CLOSED HEAD INJURY, INITIAL ENCOUNTER: Primary | ICD-10-CM

## 2024-04-30 DIAGNOSIS — T07.XXXA MULTIPLE ABRASIONS: ICD-10-CM

## 2024-04-30 LAB
ABORH RETYPE: NORMAL
ALBUMIN SERPL-MCNC: 4.1 G/DL (ref 3.4–4.8)
ALBUMIN/GLOB SERPL: 1.6 RATIO (ref 1.1–2)
ALP SERPL-CCNC: 65 UNIT/L (ref 40–150)
ALT SERPL-CCNC: <5 UNIT/L (ref 0–55)
APTT PPP: 31.9 SECONDS (ref 23.2–33.7)
AST SERPL-CCNC: 15 UNIT/L (ref 5–34)
BASOPHILS # BLD AUTO: 0.04 X10(3)/MCL
BASOPHILS NFR BLD AUTO: 0.5 %
BILIRUB SERPL-MCNC: 0.4 MG/DL
BUN SERPL-MCNC: 32.3 MG/DL (ref 8.4–25.7)
CALCIUM SERPL-MCNC: 9.1 MG/DL (ref 8.8–10)
CHLORIDE SERPL-SCNC: 107 MMOL/L (ref 98–107)
CO2 SERPL-SCNC: 25 MMOL/L (ref 23–31)
CREAT SERPL-MCNC: 1.19 MG/DL (ref 0.73–1.18)
EOSINOPHIL # BLD AUTO: 0.26 X10(3)/MCL (ref 0–0.9)
EOSINOPHIL NFR BLD AUTO: 3.2 %
ERYTHROCYTE [DISTWIDTH] IN BLOOD BY AUTOMATED COUNT: 13.1 % (ref 11.5–17)
ETHANOL SERPL-MCNC: <10 MG/DL
GFR SERPLBLD CREATININE-BSD FMLA CKD-EPI: >60 MLS/MIN/1.73/M2
GLOBULIN SER-MCNC: 2.6 GM/DL (ref 2.4–3.5)
GLUCOSE SERPL-MCNC: 162 MG/DL (ref 82–115)
GROUP & RH: NORMAL
HCT VFR BLD AUTO: 37.6 % (ref 42–52)
HGB BLD-MCNC: 12.3 G/DL (ref 14–18)
IMM GRANULOCYTES # BLD AUTO: 0.08 X10(3)/MCL (ref 0–0.04)
IMM GRANULOCYTES NFR BLD AUTO: 1 %
INDIRECT COOMBS: NORMAL
INR PPP: 1
LYMPHOCYTES # BLD AUTO: 1.82 X10(3)/MCL (ref 0.6–4.6)
LYMPHOCYTES NFR BLD AUTO: 22.2 %
MCH RBC QN AUTO: 32.5 PG (ref 27–31)
MCHC RBC AUTO-ENTMCNC: 32.7 G/DL (ref 33–36)
MCV RBC AUTO: 99.2 FL (ref 80–94)
MONOCYTES # BLD AUTO: 0.66 X10(3)/MCL (ref 0.1–1.3)
MONOCYTES NFR BLD AUTO: 8.1 %
NEUTROPHILS # BLD AUTO: 5.32 X10(3)/MCL (ref 2.1–9.2)
NEUTROPHILS NFR BLD AUTO: 65 %
NRBC BLD AUTO-RTO: 0 %
PLATELET # BLD AUTO: 217 X10(3)/MCL (ref 130–400)
PMV BLD AUTO: 9.7 FL (ref 7.4–10.4)
POTASSIUM SERPL-SCNC: 4.7 MMOL/L (ref 3.5–5.1)
PROT SERPL-MCNC: 6.7 GM/DL (ref 5.8–7.6)
PROTHROMBIN TIME: 13.1 SECONDS (ref 12.5–14.5)
RBC # BLD AUTO: 3.79 X10(6)/MCL (ref 4.7–6.1)
SODIUM SERPL-SCNC: 142 MMOL/L (ref 136–145)
SPECIMEN OUTDATE: NORMAL
WBC # SPEC AUTO: 8.18 X10(3)/MCL (ref 4.5–11.5)

## 2024-04-30 PROCEDURE — 90471 IMMUNIZATION ADMIN: CPT | Performed by: STUDENT IN AN ORGANIZED HEALTH CARE EDUCATION/TRAINING PROGRAM

## 2024-04-30 PROCEDURE — 99285 EMERGENCY DEPT VISIT HI MDM: CPT | Mod: 25

## 2024-04-30 PROCEDURE — 86901 BLOOD TYPING SEROLOGIC RH(D): CPT | Performed by: STUDENT IN AN ORGANIZED HEALTH CARE EDUCATION/TRAINING PROGRAM

## 2024-04-30 PROCEDURE — 63600175 PHARM REV CODE 636 W HCPCS: Performed by: STUDENT IN AN ORGANIZED HEALTH CARE EDUCATION/TRAINING PROGRAM

## 2024-04-30 PROCEDURE — 85610 PROTHROMBIN TIME: CPT | Performed by: STUDENT IN AN ORGANIZED HEALTH CARE EDUCATION/TRAINING PROGRAM

## 2024-04-30 PROCEDURE — 85730 THROMBOPLASTIN TIME PARTIAL: CPT | Performed by: STUDENT IN AN ORGANIZED HEALTH CARE EDUCATION/TRAINING PROGRAM

## 2024-04-30 PROCEDURE — 90715 TDAP VACCINE 7 YRS/> IM: CPT | Performed by: STUDENT IN AN ORGANIZED HEALTH CARE EDUCATION/TRAINING PROGRAM

## 2024-04-30 PROCEDURE — 99285 EMERGENCY DEPT VISIT HI MDM: CPT | Mod: FS,,, | Performed by: SURGERY

## 2024-04-30 PROCEDURE — G0390 TRAUMA RESPONS W/HOSP CRITI: HCPCS

## 2024-04-30 PROCEDURE — 80053 COMPREHEN METABOLIC PANEL: CPT | Performed by: STUDENT IN AN ORGANIZED HEALTH CARE EDUCATION/TRAINING PROGRAM

## 2024-04-30 PROCEDURE — 85025 COMPLETE CBC W/AUTO DIFF WBC: CPT | Performed by: STUDENT IN AN ORGANIZED HEALTH CARE EDUCATION/TRAINING PROGRAM

## 2024-04-30 PROCEDURE — 82077 ASSAY SPEC XCP UR&BREATH IA: CPT | Performed by: STUDENT IN AN ORGANIZED HEALTH CARE EDUCATION/TRAINING PROGRAM

## 2024-04-30 RX ADMIN — TETANUS TOXOID, REDUCED DIPHTHERIA TOXOID AND ACELLULAR PERTUSSIS VACCINE, ADSORBED 0.5 ML: 5; 2.5; 8; 8; 2.5 SUSPENSION INTRAMUSCULAR at 07:04

## 2024-05-01 NOTE — ED NOTES
Pt transferred from CT table to ED stretcher w/ c-spine immobilization maintained. No neuro changes.

## 2024-05-01 NOTE — ED PROVIDER NOTES
Encounter Date: 4/30/2024    SCRIBE #1 NOTE: I, Rogelio Heard, steffi scribing for, and in the presence of,  John Walton MD. I have scribed the following portions of the note - Other sections scribed: HPI, ROS, PE.       History     Chief Complaint   Patient presents with    Fall     GLF. Trip/mechanical. -LOC. Ambulatory to ER. Takes plavix. +headstrike. Facial wounds.     73 year old male with a pmhx of CAD, DM, HTN, and a stroke presents to the ED as a level 2 trauma following a ground level fall that occurred PTA.  The patient denies any complaints.  The patient reports that he tripped and fell.  The patient reports that he hit his head but denies a loss of consciousness.  The patient reports that he is on Plavix.  A C-collar was placed upon arrival.  The patient's PCP is Dr. Joshua Bolivar.    The history is provided by the patient. No  was used.     Review of patient's allergies indicates:  No Known Allergies  Past Medical History:   Diagnosis Date    Allergic rhinitis     CAD (coronary artery disease)     Diabetes mellitus     Gout     Hypercholesteremia     Hypertension     Nocturia     Osteoarthritis     Stroke      Past Surgical History:   Procedure Laterality Date    Cardiac stent placement (2021)      Catheter placement in coronary artery      Catheter placement in coronary artery(s) for coronary angiography, including intraprocedural injection(s) for coronary angiography, with left heart catheterization including intraprocedural injection(s) for left nish (03/09/2021)      CORONARY ANGIOPLASTY WITH STENT PLACEMENT      Dilation of coronary Artery      Dilation of Coronary Artery, One Artery with Drug-eluting Intraluminal Device, Percutaneous Approach (03/09/2021)      EXTRACTION OF TOOTH      Fluoroscopy of Left Heart using Low Osmolar Contrast (03/09/2021)      Fluoroscopy of Multiple Coronary Arteries using Low Osmolar Contrast (03/09/2021)      Measurement of Cardiac  Sampling and Pressure, Left Heart, Percutaneous Approach (03/09/2021)      Perc drug-el cor stent sing (03/09/2021)      Tooth implantation       Family History   Problem Relation Name Age of Onset    Diabetes Mother      Depression Mother      Hypertension Mother      Alcohol abuse Sister      Depression Sister       Social History     Tobacco Use    Smoking status: Never    Smokeless tobacco: Never   Substance Use Topics    Alcohol use: Yes     Alcohol/week: 1.0 standard drink of alcohol     Types: 1 Glasses of wine per week     Comment: Once a month    Drug use: Never     Review of Systems   Constitutional:  Negative for chills and fever.   HENT:  Negative for congestion, rhinorrhea and sore throat.    Eyes:  Negative for visual disturbance.   Respiratory:  Negative for cough and shortness of breath.    Cardiovascular:  Negative for chest pain.   Gastrointestinal:  Negative for abdominal pain, nausea and vomiting.   Genitourinary:  Negative for dysuria and hematuria.   Musculoskeletal:  Negative for joint swelling.   Skin:  Negative for rash.   Neurological:  Negative for weakness.   Psychiatric/Behavioral:  Negative for confusion.    All other systems reviewed and are negative.      Physical Exam     Initial Vitals   BP Pulse Resp Temp SpO2   04/30/24 1910 04/30/24 1905 04/30/24 1905 04/30/24 1905 04/30/24 1905   118/87 83 18 98.5 °F (36.9 °C) (!) 94 %      MAP       --                Physical Exam    Nursing note and vitals reviewed.  Constitutional: Airway: Normal. Breathing: Normal. Circulation: Normal. He is not diaphoretic. Pulses:Radial palpable. No distress. Cervical collar in place.   HENT:   Head: Normocephalic.   Eyes: Pupils: Normal pupils. EOM are normal. Pupils are equal, round, and reactive to light.   Pupils 3-2 mm bilaterally    Cardiovascular:  Normal rate and regular rhythm.           Pulses:       Radial pulses are 2+ on the right side and 2+ on the left side.        Dorsalis pedis pulses are  2+ on the right side and 2+ on the left side.   Pulmonary/Chest: Breath sounds normal. No respiratory distress.        Abdominal: Abdomen is soft. He exhibits no distension. There is no abdominal tenderness. The pelvis is stable.   Musculoskeletal:         General: No edema.      Cervical back: No deformity or bony tenderness. Normal.      Thoracic back: Normal. No deformity or bony tenderness.      Lumbar back: Normal. No deformity or bony tenderness.     Neurological: He is alert and oriented to person, place, and time. GCS score is 15. GCS eye subscore is 4. GCS verbal subscore is 5. GCS motor subscore is 6.   Skin: Skin is warm. No rash noted.   Abrasions to the left forehead, left cheek, and left shoulder  Superficial laceration to the bridge of the nose          ED Course   Procedures  Labs Reviewed   COMPREHENSIVE METABOLIC PANEL - Abnormal; Notable for the following components:       Result Value    Glucose Level 162 (*)     Blood Urea Nitrogen 32.3 (*)     Creatinine 1.19 (*)     All other components within normal limits   CBC WITH DIFFERENTIAL - Abnormal; Notable for the following components:    RBC 3.79 (*)     Hgb 12.3 (*)     Hct 37.6 (*)     MCV 99.2 (*)     MCH 32.5 (*)     MCHC 32.7 (*)     IG# 0.08 (*)     All other components within normal limits   PROTIME-INR - Normal   APTT - Normal   ALCOHOL,MEDICAL (ETHANOL) - Normal   CBC W/ AUTO DIFFERENTIAL    Narrative:     The following orders were created for panel order CBC auto differential.  Procedure                               Abnormality         Status                     ---------                               -----------         ------                     CBC with Differential[[id number]]       Abnormal            Final result                 Please view results for these tests on the individual orders.   TYPE & SCREEN   ABORH RETYPE          Imaging Results              CT Maxillofacial Without Contrast (Final result)  Result time 04/30/24  19:39:15      Final result by Ragini Ricketts MD (04/30/24 19:39:15)                   Impression:      Mild age indeterminate angulation of the left nasal bone.      Electronically signed by: Ragini Ricketts  Date:    04/30/2024  Time:    19:39               Narrative:    EXAMINATION:  CT MAXILLOFACIAL WITHOUT CONTRAST    CLINICAL HISTORY:  Facial trauma;    TECHNIQUE:  Noncontrast maxillofacial CT performed with axial, sagittal and coronal reconstructions.    DLP: 1800 mGycm    All CT scans at this location are performed using dose optimization techniques as appropriate to including automated exposure control, adjustment of the mA and/or kV according to patient size and/or use of iterative reconstruction technique    COMPARISON:  No relevant prior available for comparison.    FINDINGS:  BONES: Mild angulation of the left nasal bone.    SINUSES: Visualized paranasal sinuses are clear.    ORBITS: Globes are intact. Retrobulbar fat is clear.    DENTITION: Unremarkable    SOFT TISSUES: Normal noncontrast appearance.    BRAIN: Visualized intracranial structures appear unremarkable.    MASTOIDS: Well aerated.                                       CT Cervical Spine Without Contrast (Final result)  Result time 04/30/24 19:36:21      Final result by Ragini Ricketts MD (04/30/24 19:36:21)                   Impression:      No acute osseous abnormality appreciable by CT evaluation.      Electronically signed by: Ragini Ricketts  Date:    04/30/2024  Time:    19:36               Narrative:    EXAMINATION:  CT CERVICAL SPINE WITHOUT CONTRAST    CLINICAL HISTORY:  Trauma;    TECHNIQUE:  Low dose helical acquired images with axial, sagittal and coronal reformations though the cervical spine.  Contrast was not administered.    All CT scans at this location are performed using dose optimization techniques as appropriate to a performed exam including the following automated exposure control, adjustment of the mA  and/or kV according to patient size and/or use of iterative reconstruction technique    DLP: 1800 mGycm    COMPARISON:  No relevant prior available for comparison.    FINDINGS:  BONES: No fracture. Normal alignment. The dens is intact, the lateral masses of C1 are normally aligned, and the atlantodental interval with degenerative narrowing.    DISCS AND FACETS: Multilevel disc space narrowing most pronounced at C5-C6 and C6-C7 with anterior and posterior disc osteophytes.Mild multilevel facet arthropathy bilaterally.    CENTRAL CANAL AND NEURAL FORAMINA: Posterior disc osteophyte at C5-C6 with AP diameter of the thecal sac estimated 1 cm, lower limits of normal.  Mild uncovertebral hypertrophy with mild neural foraminal stenosis at C5-C6 and C6-C7.    SOFT TISSUES: Regional soft tissues are unremarkable.    LUNG APICES: Clear                                       CT Head Without Contrast (Final result)  Result time 04/30/24 19:32:59      Final result by Ragini Ricketts MD (04/30/24 19:32:59)                   Impression:      No appreciable acute intracranial abnormality by noncontrast CT evaluation.      Electronically signed by: Ragini Ricketts  Date:    04/30/2024  Time:    19:32               Narrative:    EXAMINATION:  CT HEAD WITHOUT CONTRAST    CLINICAL HISTORY:  Trauma;    TECHNIQUE:  Low dose axial CT images obtained throughout the head without intravenous contrast.  Axial, sagittal and coronal reconstructions were performed and interpreted.    DLP: 1800 mGycm    All CT scans at this location are performed using dose optimization techniques as appropriate to a performed exam including the following automated exposure control, adjustment of the mA and/or kV according to patient size and/or use of iterative reconstruction technique    COMPARISON:  CT head 04/27/2021, CT head 03/24/2021    FINDINGS:  BRAIN: Gray white differentiation is maintained. Periventricular and subcortical white matter changes  most compatible with chronic small vessel ischemic disease.  Mild to moderate cerebral atrophy.  Sequela of prior hemorrhagic infarct at the right subinsular region.  Punctate cortical hyperdensity in the region of the right parietal-occipital lobe (3, 40), unchanged from prior.  No acute hemorrhage.  No mass effect or midline shift.  The posterior fossa and midline structures are unremarkable.    VENTRICLES: Ex vacuo dilatation of the ventricles.    EXTRA-AXIAL: No abnormal extra-axial collections.    BONES: Calvarium is intact.    SINUSES AND MASTOIDS: Visualized paranasal sinuses and mastoid air cells are clear.                                       X-Ray Pelvis Routine AP (Final result)  Result time 04/30/24 19:24:59      Final result by Toni Figueroa MD (04/30/24 19:24:59)                   Narrative:    EXAMINATION  XR PELVIS ROUTINE AP    CLINICAL HISTORY  r/o bleeding or hemorrhage;    TECHNIQUE  A total of 1 image(s) submitted of the pelvis.    COMPARISON  None available at the time of initial interpretation.    FINDINGS  Regional degenerative changes are present, overall symmetric appearance of sacroiliac and hip involvement.  No convincing displaced fracture or joint incongruity is identified. The visualized pelvic ring structures are preserved. There is no suggestion of a large hip effusion. No aggressive osseous lesion or periosteal reaction is appreciated. The trabecular pattern is unremarkable.    No acute soft tissue abnormality is identified.  Extensive vascular calcification noted.    IMPRESSION  1. Regional osseous degenerative changes without convincing acute abnormality.  2. Extensive burden of vascular calcification.      Electronically signed by: Toni Figueroa  Date:    04/30/2024  Time:    19:24                                     X-Ray Chest 1 View (Final result)  Result time 04/30/24 19:25:25      Final result by Toni Figueroa MD (04/30/24 19:25:25)                   Narrative:     EXAMINATION  XR CHEST 1 VIEW    CLINICAL HISTORY  r/o bleeding or hemorrhage;    TECHNIQUE  A total of 1 frontal image(s) of the chest.    COMPARISON  None available at the time of initial interpretation.    FINDINGS  Lines/tubes/devices: ECG leads overlie the imaged region.    The cardiomediastinal silhouette and central pulmonary vasculature are unremarkable for utilized technique.  The trachea is midline. There is no lobar consolidation, pleural effusion, or pneumothorax.    There is no acute osseous or extrathoracic abnormality.    IMPRESSION  No convincing acute radiographic abnormality.      Electronically signed by: Toni Figueroa  Date:    04/30/2024  Time:    19:25                                     Medications   Tdap (BOOSTRIX) vaccine injection 0.5 mL (0.5 mLs Intramuscular Given by Other 4/30/24 1912)     Medical Decision Making  73-year-old presenting after a mechanical fall from standing  Positive head trauma on Plavix  GCS 15 has some abrasions to his left forehead left cheek no repairable lacerations or wounds  No acute fractures no intracranial hemorrhage scans negative for acute traumatic injuries patient amenable to plan for discharge local wound care to his abrasions    ED management - tdap     The differential diagnoses includes but is not limited to:  Concussion, ich, fracture, contusion       Problems Addressed:  Closed head injury, initial encounter: acute illness or injury that poses a threat to life or bodily functions  Fall, initial encounter: acute illness or injury that poses a threat to life or bodily functions  Multiple abrasions: acute illness or injury that poses a threat to life or bodily functions    Amount and/or Complexity of Data Reviewed  Independent Historian: EMS     Details: See HPI  Labs: ordered.  Radiology: ordered and independent interpretation performed.     Details: Head CT - no obvious bleed or mass       Risk  OTC drugs.  Prescription drug management.             Scribe Attestation:   Scribe #1: I performed the above scribed service and the documentation accurately describes the services I performed. I attest to the accuracy of the note.    Attending Attestation:           Physician Attestation for Scribe:  Physician Attestation Statement for Scribe #1: I, John Walton MD, reviewed documentation, as scribed by Rogelio Heard in my presence, and it is both accurate and complete.             ED Course as of 05/01/24 0028 Tue Apr 30, 2024 2047 Scans negative patient GCS 15 entire time department all of his facial injuries or abrasions superficial nothing requires repair local wound care only he and his wife updated on findings they are amenable to plan for discharge at this time return precautions given [AC]      ED Course User Index  [AC] John Walton IV, MD                           Clinical Impression:  Final diagnoses:  [T07.XXXA] Multiple abrasions  [W19.XXXA] Fall, initial encounter  [S09.90XA] Closed head injury, initial encounter (Primary)          ED Disposition Condition    Discharge Stable          ED Prescriptions    None       Follow-up Information       Follow up With Specialties Details Why Contact Info    [facility] - Emergency Dept Emergency Medicine Go to  If symptoms worsen [address] [address] [phone]    GALDINO Bolivar Jr., MD Family Medicine Schedule an appointment as soon as possible for a visit   [address] [place] [address]  [phone]      Primary care physician  Schedule an appointment as soon as possible for a visit   Follow up with you primary care physician.   If you do not have a primary care physician call [phone] to schedule an appointment.             John Walton IV, MD  05/01/24 0028

## 2024-05-01 NOTE — CONSULTS
"   Trauma Surgery   Activation Note    Patient Name: Asher Villa  MRN: [id number]   YOB: 1950  Date: 04/30/2024    LEVEL 2 TRAUMA     Subjective:   History of present illness: Patient is an approximately 73 year old male who presents to the ED via personal transportation as a level 2 trauma following ground level fall.  Has a history of Parkinson's disease.  Patient reports losing his balance while walking in the park when he fell.  Denies LOC. + head strike.  Abrasions to the face.    Primary Survey:  A Intact   B Bilateral breath sounds   C HDS   D GCS 15(E 4, V 5, M 6)    E exposed, log-rolled and examined (see below)   F See below     VITAL SIGNS: 24 HR MIN & MAX LAST   Temp  Min: 98.5 °F (36.9 °C)  Max: 98.5 °F (36.9 °C)  98.5 °F (36.9 °C)   BP  Min: 125/80  Max: 125/80  125/80    Pulse  Min: 83  Max: 85  85    Resp  Min: 18  Max: 18  18    SpO2  Min: 94 %  Max: 95 %  95 %      HT: 5' 8" (172.7 cm)  WT: 63 kg (139 lb)  BMI: 21.1       Scheduled Meds:  Continuous Infusions:  PRN Meds:    ROS: 12 point ROS negative except as stated in HPI    Allergies: NKDA  PMH:  Parkinson's and cardiac stents on Plavix  PSH: Noncontributory  Social history: Unknown  Objective:   Secondary Survey:   General: Well developed, well nourished, no acute distress, AAOx3  Neuro: CNII-XII grossly intact  HEENT:  Normocephalic, atraumatic, PERRL, cervical collar in place  CV:  RRR  Pulse: 2+ RP b/l, 2+ DP b/l   Resp/chest:  Non-labored breathing, satting on room air  GI:  Abdomen soft, non-tender, non-distended  Extremities: Moves all 4 spontaneously and purposefully, no obvious gross deformities.  Back/Spine: No bony TTP, no palpable step offs or deformities.  Cervical back: Normal. No tenderness.  Thoracic back: Normal. No tenderness.  Lumbar back: Normal. No tenderness.  Skin/wounds:  Warm, well perfused, abrasion to left cheek forehead, nasal bridge  Psych: Normal mood and affect.    Labs:  reviewed    Imaging:  CT " Maxillofacial Without Contrast   Final Result      Mild age indeterminate angulation of the left nasal bone.         Electronically signed by: Ragini Ricketts   Date:    04/30/2024   Time:    19:39      CT Cervical Spine Without Contrast   Final Result      No acute osseous abnormality appreciable by CT evaluation.         Electronically signed by: Ragini Ricketts   Date:    04/30/2024   Time:    19:36      CT Head Without Contrast   Final Result      No appreciable acute intracranial abnormality by noncontrast CT evaluation.         Electronically signed by: Ragini Ricketts   Date:    04/30/2024   Time:    19:32      X-Ray Pelvis Routine AP   Final Result      X-Ray Chest 1 View   Final Result           Assessment & Plan:   Fall    Imaging negative for acute traumatic injuries warranting admission. GCS 15. Disposition per ED.      4/30/2024 8:59 PM     The above findings, diagnostics, and treatment plan were discussed with Dr. Wilmer Jacinto  who will follow with further assessments and recommendations. Please call with any questions, concerns, or clinical status changes.  This note/OR report was created with the assistance of  voice recognition software or phone  dictation.  There may be transcription errors as a result of using this technology however minimal. Effort has been made to assure accuracy of transcription but any obvious errors or omissions should be clarified with the author of the document.

## 2024-05-01 NOTE — ED NOTES
Pt transferred from trauma stretcher to CT table w/ c-spine immobilization maintained. No neuro changes.

## 2024-05-01 NOTE — ED NOTES
Pt arrives in wheelchair from front triage. Pt assisted onto trauma stretcher w/ minimal RN assistance. Pt steady with standing and ambulating to trauma stretcher. Spine palpated by Isabelle CHRISTIAN at this time, no tenderness, step-offs or deformities noted. Neuro intact. Pt placed in hospital gown, cardio-respiratory monitoring placed. Preparing for c-collar placement.

## 2024-05-01 NOTE — DISCHARGE INSTRUCTIONS
Thanks for letting use take care of you today! It is our goal to give you courteous care and to keep you comfortable and informed. If you have any questions before you leave I will be happy to try and answer them.     Advice after your visit:  Your visit in the emergency department is NOT definitive care - please follow-up with your primary care doctor and/or specialist within 1-2 days. If you do not have a primary care physician call [phone] to schedule an appointment. Please return if you have any worsening in your condition or if you have any other concerns.    Return to the emergency department if any worsening symptoms including fever, chest pain, difficulty breathing, weakness, numbness, tingling, nausea, vomiting, inability to eat, drink or take your medication, or any other new symptoms or concerns arise.      Please signup for MyChart as noted below in your paperwork to review all labwork, imaging results, and any other incidental findings from today's visit.     If you had radiology exams like an XRAY or CT in the emergency Department the interpreation on them may be preliminary - there may be less time sensitive findings on the reports please obtain these reports within 24 hours from the hospital or by using your out on your mobile phone to access records.  Bring these to your primary care doctor and/or specialist for further review of incidental findings.    Please review any LAB WORK from your visit today with your primary care physician.    If you were prescribed OPIATE PAIN MEDICATION - please understand of these medications can be addictive, you may fill less of the prescription was written for, you do not have to take the full prescription.  You may discard what you do not use.  Please seek help if you feel you are having problems with addiction.  Do not drive or operate heavy machinery if you are taking sedating medications.  Do not mix these medications with alcohol.      If you had a SPLINT  placed in the emergency department if you have severe pain numbness tingling or discoloration of year digits please remove the splint and return to the emergency department for further evaluation as this may represent a sign of compromise to the nerves or blood vessels due to swelling.    If you had SUTURES in the emergency department please have them removed in the prescribed time frame typically within 7-14 days.  You may shower but please do not bathe or swim.  Keep the wounds clean and dry and covered with a clean dressing.  Please return if he have any signs of infection like redness or drainage or pain at the suture site.    Please take the full course of  any ANTIBIOTICS you were prescribed - incomplete courses of antibiotics can cause resistance to antibiotics in the future which will make it difficult to treat any infections you may have.

## 2025-01-08 ENCOUNTER — OFFICE VISIT (OUTPATIENT)
Dept: NEUROLOGY | Facility: CLINIC | Age: 75
End: 2025-01-08
Payer: MEDICARE

## 2025-01-08 VITALS
WEIGHT: 146 LBS | HEART RATE: 88 BPM | SYSTOLIC BLOOD PRESSURE: 104 MMHG | HEIGHT: 68 IN | DIASTOLIC BLOOD PRESSURE: 71 MMHG | BODY MASS INDEX: 22.13 KG/M2

## 2025-01-08 DIAGNOSIS — G20.A1 PARKINSON'S DISEASE, UNSPECIFIED WHETHER DYSKINESIA PRESENT, UNSPECIFIED WHETHER MANIFESTATIONS FLUCTUATE: Primary | ICD-10-CM

## 2025-01-08 DIAGNOSIS — I69.154 HEMIPLEGIA AND HEMIPARESIS FOLLOWING NONTRAUMATIC INTRACEREBRAL HEMORRHAGE AFFECTING LEFT NON-DOMINANT SIDE: ICD-10-CM

## 2025-01-08 PROCEDURE — 99999 PR PBB SHADOW E&M-EST. PATIENT-LVL III: CPT | Mod: PBBFAC,,, | Performed by: NURSE PRACTITIONER

## 2025-01-08 PROCEDURE — 99214 OFFICE O/P EST MOD 30 MIN: CPT | Mod: S$PBB,,, | Performed by: NURSE PRACTITIONER

## 2025-01-08 PROCEDURE — 99213 OFFICE O/P EST LOW 20 MIN: CPT | Mod: PBBFAC | Performed by: NURSE PRACTITIONER

## 2025-01-08 RX ORDER — AMANTADINE HYDROCHLORIDE 100 MG/1
100 CAPSULE, GELATIN COATED ORAL DAILY
Qty: 30 CAPSULE | Refills: 5 | Status: SHIPPED | OUTPATIENT
Start: 2025-01-08

## 2025-01-08 RX ORDER — RASAGILINE 0.5 MG/1
0.5 TABLET ORAL DAILY
Qty: 30 TABLET | Refills: 5 | Status: CANCELLED | OUTPATIENT
Start: 2025-01-08

## 2025-01-08 NOTE — PROGRESS NOTES
Subjective:       Patient ID: Asher Villa is a 74 y.o. male.    Chief Complaint: Parkinson disease (Tremors to hands has increased a little. States he could barely sign the paperwork at the . Balance has gotten worse. States he recalls he has fallen 3 times in the park. Twice he hit his head. He did go to ER and CT head was head. ) and Cerebrovascular Accident (Left leg and left foot has gotten weaker. Uses walker all the time when he walks in the park. Is having some back spasms.)      History of Present Illness:  Follow up visit for parkinson's, history of CVA.  Says he is about the same since last visit.  Taking sinemet 2 tabs tid, but not consistent with afternoon dose.  Still feels very slow, tremor still bothersome.  Doesn't like to go out to eat anymore because of tremor.  Walking feels worse.  He has fallen 3 times since our last visit.  Has not done PT in a long time.  Unsure if he wants to participate in PT currently.  No new stroke like symptoms.                Past Medical History:   Diagnosis Date    Allergic rhinitis     CAD (coronary artery disease)     Diabetes mellitus     Gout     Hypercholesteremia     Hypertension     Nocturia     Osteoarthritis     Stroke        Past Surgical History:   Procedure Laterality Date    Cardiac stent placement (2021)      Catheter placement in coronary artery      Catheter placement in coronary artery(s) for coronary angiography, including intraprocedural injection(s) for coronary angiography, with left heart catheterization including intraprocedural injection(s) for left nish (03/09/2021)      CORONARY ANGIOPLASTY WITH STENT PLACEMENT      Dilation of coronary Artery      Dilation of Coronary Artery, One Artery with Drug-eluting Intraluminal Device, Percutaneous Approach (03/09/2021)      EXTRACTION OF TOOTH      Fluoroscopy of Left Heart using Low Osmolar Contrast (03/09/2021)      Fluoroscopy of Multiple Coronary Arteries using Low Osmolar Contrast  (03/09/2021)      Measurement of Cardiac Sampling and Pressure, Left Heart, Percutaneous Approach (03/09/2021)      Perc drug-el cor stent sing (03/09/2021)      Tooth implantation      WISDOM TOOTH EXTRACTION          Family History   Problem Relation Name Age of Onset    Diabetes Mother      Depression Mother      Hypertension Mother      Alcohol abuse Sister      Depression Sister          Social History     Socioeconomic History    Marital status:     Number of children: 2   Occupational History    Occupation: retired    Tobacco Use    Smoking status: Never    Smokeless tobacco: Never   Substance and Sexual Activity    Alcohol use: Yes     Alcohol/week: 1.0 standard drink of alcohol     Types: 1 Glasses of wine per week     Comment: Once a month    Drug use: Never   Social History Narrative    2 daughters, 5 grands        Outpatient Encounter Medications as of 1/8/2025   Medication Sig Dispense Refill    allopurinoL (ZYLOPRIM) 300 MG tablet Take 1 tablet (300 mg total) by mouth once daily. 90 tablet 3    atorvastatin (LIPITOR) 10 MG tablet Take 1 tablet (10 mg total) by mouth once daily. 90 tablet 3    b complex vitamins capsule Take 1 capsule by mouth once daily.      carbidopa-levodopa  mg (SINEMET)  mg per tablet TAKE TWO TABLETS BY MOUTH THREE TIMES DAILY 540 tablet 3    clopidogreL (PLAVIX) 75 mg tablet Take 75 mg by mouth once daily.      glimepiride (AMARYL) 2 MG tablet Take 0.5 tablets by mouth Daily.      metFORMIN (GLUCOPHAGE) 1000 MG tablet TAKE 1 TABLET BY MOUTH TWICE A DAY WITH A MEAL      nystatin (MYCOSTATIN) cream as needed.      omega-3 fatty acids/fish oil (FISH OIL-OMEGA-3 FATTY ACIDS) 300-1,000 mg capsule Take 1 capsule by mouth once daily.      sertraline (ZOLOFT) 100 MG tablet Take 1.5 tablets (150 mg total) by mouth once daily. 135 tablet 3    amantadine HCL (SYMMETREL) 100 mg capsule Take 1 capsule (100 mg total) by mouth once daily. 30 capsule 5     "clotrimazole-betamethasone 1-0.05% (LOTRISONE) cream Apply topically as needed. (Patient not taking: Reported on 1/8/2025)      [DISCONTINUED] allopurinoL (ZYLOPRIM) 300 MG tablet Take 1 tablet (300 mg total) by mouth once daily. 90 tablet 3    [DISCONTINUED] atorvastatin (LIPITOR) 10 MG tablet Take 1 tablet (10 mg total) by mouth once daily. 90 tablet 3    [DISCONTINUED] sertraline (ZOLOFT) 100 MG tablet Take 1.5 tablets (150 mg total) by mouth once daily. 135 tablet 3     No facility-administered encounter medications on file as of 1/8/2025.      Objective:   /71 (BP Location: Left arm, Patient Position: Sitting)   Pulse 88   Ht 5' 8" (1.727 m)   Wt 66.2 kg (146 lb)   BMI 22.20 kg/m²        Physical Exam  NAD  alert and oriented  cognition and perception intact  no aphasia  Hypomimia  hypophonia  EOMI  no facial asymmetry  no dysarthria  Right hand rest tremor mild  Stooped posture, decreased arm swing on the left, short stride length       Assessment & Plan:      1. Parkinson's disease, unspecified whether dyskinesia present, unspecified whether manifestations fluctuate  Overview:  This is a man who suffered a right basal ganglia intracranial hemorrhage in March 2021. While in the hospital, general Neurology Services were consulted for tremor.  At his office follow-up, the diagnosis of Parkinson's disease was made.  He was started on carbidopa levodopa at that time.    Assessment & Plan:  -continue sinemet 2 tabs tid.  Says he has tried 3 tabs in the past and did not notice much difference  -considered addition of azilect but serious interaction with zoloft.  Try adding amantadine.    -discussed rise pt again.  Willing to try    Orders:  -     amantadine HCL (SYMMETREL) 100 mg capsule; Take 1 capsule (100 mg total) by mouth once daily.  Dispense: 30 capsule; Refill: 5  -     Ambulatory Referral/Consult to Physical Therapy; Future; Expected date: 01/15/2025    2. 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 There may be transcription errors as a result of using this technology however minimal. Effort has been made to assure accuracy of transcription but any obvious errors or omissions should be clarified with the author of the document.

## 2025-01-08 NOTE — ASSESSMENT & PLAN NOTE
-continue sinemet 2 tabs tid.  Says he has tried 3 tabs in the past and did not notice much difference  -considered addition of azilect but serious interaction with zoloft.  Try adding amantadine.    -discussed rise pt again.  Willing to try

## 2025-05-01 DIAGNOSIS — G20.A1 PARKINSON DISEASE: ICD-10-CM

## 2025-05-02 RX ORDER — CARBIDOPA AND LEVODOPA 25; 100 MG/1; MG/1
2 TABLET ORAL 3 TIMES DAILY
Qty: 540 TABLET | Refills: 0 | Status: SHIPPED | OUTPATIENT
Start: 2025-05-02

## 2025-06-28 DIAGNOSIS — G20.A1 PARKINSON'S DISEASE, UNSPECIFIED WHETHER DYSKINESIA PRESENT, UNSPECIFIED WHETHER MANIFESTATIONS FLUCTUATE: ICD-10-CM

## 2025-06-30 RX ORDER — AMANTADINE HYDROCHLORIDE 100 MG/1
100 CAPSULE, GELATIN COATED ORAL
Qty: 30 CAPSULE | Refills: 0 | Status: SHIPPED | OUTPATIENT
Start: 2025-06-30

## 2025-07-26 DIAGNOSIS — G20.A1 PARKINSON DISEASE: ICD-10-CM

## 2025-07-28 RX ORDER — CARBIDOPA AND LEVODOPA 25; 100 MG/1; MG/1
2 TABLET ORAL 3 TIMES DAILY
Qty: 540 TABLET | Refills: 2 | Status: SHIPPED | OUTPATIENT
Start: 2025-07-28

## 2025-08-07 ENCOUNTER — OFFICE VISIT (OUTPATIENT)
Dept: NEUROLOGY | Facility: CLINIC | Age: 75
End: 2025-08-07
Payer: MEDICARE

## 2025-08-07 VITALS
HEIGHT: 68 IN | HEART RATE: 76 BPM | DIASTOLIC BLOOD PRESSURE: 79 MMHG | SYSTOLIC BLOOD PRESSURE: 118 MMHG | BODY MASS INDEX: 20.92 KG/M2 | WEIGHT: 138 LBS

## 2025-08-07 DIAGNOSIS — I69.154 HEMIPLEGIA AND HEMIPARESIS FOLLOWING NONTRAUMATIC INTRACEREBRAL HEMORRHAGE AFFECTING LEFT NON-DOMINANT SIDE: Primary | ICD-10-CM

## 2025-08-07 DIAGNOSIS — G20.A1 PARKINSON DISEASE: ICD-10-CM

## 2025-08-07 PROCEDURE — 99215 OFFICE O/P EST HI 40 MIN: CPT | Mod: S$PBB,,, | Performed by: PSYCHIATRY & NEUROLOGY

## 2025-08-07 PROCEDURE — 99999 PR PBB SHADOW E&M-EST. PATIENT-LVL III: CPT | Mod: PBBFAC,,, | Performed by: PSYCHIATRY & NEUROLOGY

## 2025-08-07 PROCEDURE — 99213 OFFICE O/P EST LOW 20 MIN: CPT | Mod: PBBFAC | Performed by: PSYCHIATRY & NEUROLOGY

## 2025-08-07 RX ORDER — CARBIDOPA AND LEVODOPA 25; 100 MG/1; MG/1
2 TABLET ORAL 4 TIMES DAILY
Qty: 720 TABLET | Refills: 2 | Status: SHIPPED | OUTPATIENT
Start: 2025-08-07

## 2025-08-07 NOTE — PROGRESS NOTES
Chief Complaint   Patient presents with    Tremors     Pt here for 6 month f/u for PD. Pt here today with wife and she states she see a decline since his last OV. Pt states he is falling more frequent. He's has about four falls within two weeks. He did hurt himself but no ER visit. Tremors are in both hands but mainly in his right hand to where he's having trouble writing. States he is also having severe weakness in arm and left leg.  Denies any injuries.        This is a 75 y.o. male here for follow up for  Parkinson's disease.  He is here with his wife today.  She has seen a decline since his last office visit.  He is falling more often.   Had 2 falls yesterday evening in his bathroom by his toilet.  A few weeks ago had a fall around 4:30 a.m. while walking into bed.  He did hit his head on the coffee table.  He had a fall again the next day when he was going to sit on the toilet.  He has been dealing with what he thinks has a bedsore on his bottom.  He has had several physicians as well as wound care nurses look at it but no one has suggested a possible etiology.  It has been there for 5 years and it is painful when he sits on his bottom.  He is doing therapy at [facility] physical therapy and goes 1 day a week although he does not enjoy going.  He is not interested in doing occupational therapy.    Medication List with Changes/Refills   Current Medications    ALLOPURINOL (ZYLOPRIM) 300 MG TABLET    Take 1 tablet (300 mg total) by mouth once daily.    AMANTADINE HCL (SYMMETREL) 100 MG CAPSULE    TAKE ONE CAPSULE BY MOUTH ONCE DAILY    ATORVASTATIN (LIPITOR) 10 MG TABLET    Take 1 tablet (10 mg total) by mouth once daily.    B COMPLEX VITAMINS CAPSULE    Take 1 capsule by mouth once daily.    CLOPIDOGREL (PLAVIX) 75 MG TABLET    Take 75 mg by mouth once daily.    CLOTRIMAZOLE-BETAMETHASONE 1-0.05% (LOTRISONE) CREAM    Apply topically as needed.    GLIMEPIRIDE (AMARYL) 2 MG TABLET    Take 0.5 tablets by mouth Daily.     METFORMIN (GLUCOPHAGE) 1000 MG TABLET    TAKE 1 TABLET BY MOUTH TWICE A DAY WITH A MEAL    NYSTATIN (MYCOSTATIN) CREAM    as needed.    OMEGA-3 FATTY ACIDS/FISH OIL (FISH OIL-OMEGA-3 FATTY ACIDS) 300-1,000 MG CAPSULE    Take 1 capsule by mouth once daily.    SERTRALINE (ZOLOFT) 100 MG TABLET    Take 1.5 tablets (150 mg total) by mouth once daily.   Changed and/or Refilled Medications    Modified Medication Previous Medication    CARBIDOPA-LEVODOPA  MG (SINEMET)  MG PER TABLET carbidopa-levodopa  mg (SINEMET)  mg per tablet       Take 2 tablets by mouth 4 (four) times daily.    TAKE TWO TABLETS BY MOUTH THREE TIMES DAILY        Vitals:    08/07/25 1451   BP: 118/79   Pulse: 76    Last dose of c/l 6 hours earlier    General:  Alert and oriented  NAD  No overt edema    Cognition and Comprehension:  Speech and language intact  Follows commands    hypomimia  hypophonia    Cranial nerves:   CN 2_ Visual fields (full to confrontation both eyes)  CN 3, 4, 6_ Intact,, no nystagmus  CN 7_no face asymmetry;   CN 8_hearing intact to spoken voice  CN 9, 10, 11_voice normal, shoulder shrug ok; deltoids not fatigable     Muscle Strength and Tone:  Normal upper extremity tone  Normal lower extremity tone  LUE 4+/5, RUE strength normal  Normal lower extremity strength    Right hand rest tremor mild  Mild RUE bradykinesia and tapering, bradykinesia with toe tap on the right  cogwheel Rigidity RLE>RUE    Coordination and Gait:  Stooped posture, short stride length today  Using walker    1. 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 Parkinson disease  Overview:  This is a man who suffered a right basal ganglia intracranial hemorrhage in March 2021. While in the hospital, general Neurology Services were consulted for tremor.  At his office follow-up, the diagnosis of Parkinson's disease was made.  He was started on carbidopa levodopa at that  time.    Orders:  -     carbidopa-levodopa  mg (SINEMET)  mg per tablet; Take 2 tablets by mouth 4 (four) times daily.  Dispense: 720 tablet; Refill: 2     Increase to 4 times daily dosing  Cont amantadine    40 min totaltime

## 2025-08-10 DIAGNOSIS — G20.A1 PARKINSON'S DISEASE, UNSPECIFIED WHETHER DYSKINESIA PRESENT, UNSPECIFIED WHETHER MANIFESTATIONS FLUCTUATE: ICD-10-CM

## 2025-08-11 RX ORDER — AMANTADINE HYDROCHLORIDE 100 MG/1
100 CAPSULE, GELATIN COATED ORAL
Qty: 30 CAPSULE | Refills: 0 | Status: SHIPPED | OUTPATIENT
Start: 2025-08-11 | End: 2025-08-13

## 2025-08-13 DIAGNOSIS — G20.A1 PARKINSON'S DISEASE, UNSPECIFIED WHETHER DYSKINESIA PRESENT, UNSPECIFIED WHETHER MANIFESTATIONS FLUCTUATE: ICD-10-CM

## 2025-08-13 RX ORDER — AMANTADINE HYDROCHLORIDE 100 MG/1
100 CAPSULE, GELATIN COATED ORAL
Qty: 30 CAPSULE | Refills: 0 | Status: SHIPPED | OUTPATIENT
Start: 2025-08-13